# Patient Record
Sex: FEMALE | Race: WHITE | Employment: OTHER | URBAN - METROPOLITAN AREA
[De-identification: names, ages, dates, MRNs, and addresses within clinical notes are randomized per-mention and may not be internally consistent; named-entity substitution may affect disease eponyms.]

---

## 2017-05-23 ENCOUNTER — HOSPITAL ENCOUNTER (INPATIENT)
Facility: HOSPITAL | Age: 62
LOS: 4 days | Discharge: HOME | DRG: 280 | End: 2017-05-28
Attending: EMERGENCY MEDICINE | Admitting: INTERNAL MEDICINE
Payer: COMMERCIAL

## 2017-05-23 ENCOUNTER — APPOINTMENT (OUTPATIENT)
Dept: GENERAL RADIOLOGY | Facility: HOSPITAL | Age: 62
DRG: 280 | End: 2017-05-23
Payer: COMMERCIAL

## 2017-05-23 DIAGNOSIS — I21.4 NSTEMI (NON-ST ELEVATED MYOCARDIAL INFARCTION) (HCC): ICD-10-CM

## 2017-05-23 DIAGNOSIS — I50.9 ACUTE ON CHRONIC CONGESTIVE HEART FAILURE, UNSPECIFIED CONGESTIVE HEART FAILURE TYPE: ICD-10-CM

## 2017-05-23 DIAGNOSIS — R06.89 RESPIRATORY INSUFFICIENCY: Primary | ICD-10-CM

## 2017-05-23 DIAGNOSIS — R19.5 FECAL OCCULT BLOOD TEST POSITIVE: ICD-10-CM

## 2017-05-23 DIAGNOSIS — R09.02 HYPOXIA: ICD-10-CM

## 2017-05-23 DIAGNOSIS — D50.9 MICROCYTIC ANEMIA: ICD-10-CM

## 2017-05-23 PROCEDURE — 71010 XR CHEST AP PORTABLE  (CPT=71010): CPT | Performed by: EMERGENCY MEDICINE

## 2017-05-23 RX ORDER — FUROSEMIDE 10 MG/ML
40 INJECTION INTRAMUSCULAR; INTRAVENOUS ONCE
Status: COMPLETED | OUTPATIENT
Start: 2017-05-23 | End: 2017-05-23

## 2017-05-24 ENCOUNTER — APPOINTMENT (OUTPATIENT)
Dept: CV DIAGNOSTICS | Facility: HOSPITAL | Age: 62
DRG: 280 | End: 2017-05-24
Attending: INTERNAL MEDICINE
Payer: COMMERCIAL

## 2017-05-24 ENCOUNTER — APPOINTMENT (OUTPATIENT)
Dept: CT IMAGING | Facility: HOSPITAL | Age: 62
DRG: 280 | End: 2017-05-24
Attending: EMERGENCY MEDICINE
Payer: COMMERCIAL

## 2017-05-24 PROBLEM — I21.4 NSTEMI (NON-ST ELEVATED MYOCARDIAL INFARCTION) (HCC): Status: ACTIVE | Noted: 2017-05-24

## 2017-05-24 PROBLEM — R19.5 FECAL OCCULT BLOOD TEST POSITIVE: Status: ACTIVE | Noted: 2017-05-24

## 2017-05-24 PROBLEM — I50.9 ACUTE ON CHRONIC CONGESTIVE HEART FAILURE, UNSPECIFIED CONGESTIVE HEART FAILURE TYPE: Status: ACTIVE | Noted: 2017-05-24

## 2017-05-24 PROBLEM — D50.9 MICROCYTIC ANEMIA: Status: ACTIVE | Noted: 2017-05-24

## 2017-05-24 PROBLEM — R09.02 HYPOXIA: Status: ACTIVE | Noted: 2017-05-24

## 2017-05-24 PROCEDURE — 30233N1 TRANSFUSION OF NONAUTOLOGOUS RED BLOOD CELLS INTO PERIPHERAL VEIN, PERCUTANEOUS APPROACH: ICD-10-PCS | Performed by: HOSPITALIST

## 2017-05-24 PROCEDURE — 71260 CT THORAX DX C+: CPT | Performed by: EMERGENCY MEDICINE

## 2017-05-24 PROCEDURE — 99253 IP/OBS CNSLTJ NEW/EST LOW 45: CPT | Performed by: INTERNAL MEDICINE

## 2017-05-24 PROCEDURE — 99291 CRITICAL CARE FIRST HOUR: CPT | Performed by: INTERNAL MEDICINE

## 2017-05-24 PROCEDURE — 93306 TTE W/DOPPLER COMPLETE: CPT | Performed by: INTERNAL MEDICINE

## 2017-05-24 RX ORDER — HEPARIN SODIUM AND DEXTROSE 10000; 5 [USP'U]/100ML; G/100ML
INJECTION INTRAVENOUS CONTINUOUS
Status: CANCELLED | OUTPATIENT
Start: 2017-05-24

## 2017-05-24 RX ORDER — POTASSIUM CHLORIDE 20 MEQ/1
40 TABLET, EXTENDED RELEASE ORAL ONCE
Status: COMPLETED | OUTPATIENT
Start: 2017-05-24 | End: 2017-05-24

## 2017-05-24 RX ORDER — HEPARIN SODIUM 1000 [USP'U]/ML
60 INJECTION, SOLUTION INTRAVENOUS; SUBCUTANEOUS ONCE
Status: DISCONTINUED | OUTPATIENT
Start: 2017-05-24 | End: 2017-05-24

## 2017-05-24 RX ORDER — SODIUM CHLORIDE 9 MG/ML
INJECTION, SOLUTION INTRAVENOUS
Status: DISPENSED
Start: 2017-05-24 | End: 2017-05-24

## 2017-05-24 RX ORDER — HEPARIN SODIUM AND DEXTROSE 10000; 5 [USP'U]/100ML; G/100ML
12 INJECTION INTRAVENOUS ONCE
Status: DISCONTINUED | OUTPATIENT
Start: 2017-05-24 | End: 2017-05-24

## 2017-05-24 RX ORDER — PANTOPRAZOLE SODIUM 40 MG/1
40 TABLET, DELAYED RELEASE ORAL
Status: DISCONTINUED | OUTPATIENT
Start: 2017-05-24 | End: 2017-05-28

## 2017-05-24 RX ORDER — BENZONATATE 100 MG/1
100 CAPSULE ORAL 3 TIMES DAILY PRN
Status: DISCONTINUED | OUTPATIENT
Start: 2017-05-24 | End: 2017-05-28

## 2017-05-24 RX ORDER — HEPARIN SODIUM 5000 [USP'U]/ML
5000 INJECTION, SOLUTION INTRAVENOUS; SUBCUTANEOUS EVERY 12 HOURS SCHEDULED
Status: DISCONTINUED | OUTPATIENT
Start: 2017-05-24 | End: 2017-05-25

## 2017-05-24 RX ORDER — ASPIRIN 81 MG/1
324 TABLET, CHEWABLE ORAL ONCE
Status: COMPLETED | OUTPATIENT
Start: 2017-05-24 | End: 2017-05-24

## 2017-05-24 NOTE — PROGRESS NOTES
SPOKE WITH DR. Antonio Hightower REGARDING PATIENT PLAN OF CARE. PATIENT'S 2D ECHO RESULTED LV FUNCTION NORMAL. PATIENT HAD SEVERE ANEMIA ON ADMISSION, CAUSING LACK OF OXYGENATION TO HER HEART. PLAN TO RECHECK H/H, TRANSFUSE IF NECESSARY, AND START LOPRESSOR.  NOTIFI

## 2017-05-24 NOTE — CONSULTS
Cardiology Consult Phoebe Burkitt)   Dictated #[id number]  May 24, 3027    IMPRESSION:  Severe anemia--iron deficiency  --? GI blood loss  Leukocytosis/Thrombocytosis  CHF--probably high output  Elevated troponins-doubt ACS--probably demand ischemia  Hyponatremia

## 2017-05-24 NOTE — PROGRESS NOTES
SPOKE WITH DR. Marisa Munguia VIA TELEPHONE REGARDING PLAN OF CARE. DUE TO PATIENT'S INTERMITTENT RESPIRATORY DISTRESS AND DESATURATION, PATIENT TO REMAIN ICU STATUS TODAY. CONTINUING TO MONITOR FREQUENTLY.

## 2017-05-24 NOTE — PLAN OF CARE
Problem: PAIN - ADULT  Goal: Verbalizes/displays adequate comfort level or patient’s stated pain goal  INTERVENTIONS:  - Encourage pt to monitor pain and request assistance  - Assess pain using appropriate pain scale  - Administer analgesics based on type on oxygen saturation or ABGs  - Provide Smoking Cessation handout, if applicable  - Encourage broncho-pulmonary hygiene including cough, deep breathe, Incentive Spirometry  - Assess the need for suctioning and perform as needed  - Assess and instruct to re

## 2017-05-24 NOTE — CONSULTS
[facility]    PATIENT'S NAME: Shon Baum   ATTENDING PHYSICIAN: José Miguel Peters. Reuben Ford MD   CONSULTING PHYSICIAN: Sean Antonio MD   PATIENT ACCOUNT#:   [id number]    LOCATION:  [address] #:   [id number]       DATE OF BIRTH: history of myocardial infarct, congestive heart failure. No palpitations, presyncope, or syncope. GASTROINTESTINAL: No nausea or vomiting. No melena or hematochezia. GENITOURINARY: No dysuria or hematuria.   MUSCULOSKELETAL: There is no limiting arthrit thrombocytosis. 2.   Congestive heart failure:  Probably high output heart failure. 3.   Elevated troponins:  Doubt ACS, probably demand ischemia. 4.   Hyponatremia. RECOMMENDATIONS:    1. Blood transfusion, diuresis. 2.   Serial enzymes.   3.   Ec

## 2017-05-24 NOTE — PROGRESS NOTES
[place] [facility]    Progress Note    Micki Hansen Patient Status:  Inpatient    1955 MRN [id number]   Location [facility] 2W/SW Attending Avril Christensen, [address] [facility] [address] Day # 1 PCP Seth Soto         Assessment and Plan:     Respi Sodium (Porcine)  5,000 Units Subcutaneous 2 times per day   • metoprolol Tartrate  25 mg Oral 2x Daily(Beta Blocker)   • nitroGLYCERIN  0.5 inch Topical Q6H             Results:     Lab Results  Component Value Date   WBC 18.3* 05/24/2017   HGB 8.6* 05/24 hypertrophy (strain) or digitalis effect -consider ischemia consider Anterior ischemia.  ABNORMAL When compared with ECG of 05/24/2017 00:41:12 No significant changes have occurred Electronically signed on 05/24/2017 at 12:15 by Luis Armando Reich MD    Ekg 12-le

## 2017-05-24 NOTE — ED INITIAL ASSESSMENT (HPI)
Pt reports generalized weakness. Pt reports sob. Pt reports dry cough. Pt denies cp. Pt appears to be jaundice.

## 2017-05-24 NOTE — ED PROVIDER NOTES
Patient Seen in: [facility] AND [facility] Emergency Department    History   Patient presents with:  Dehydration (metabolic/constitutional)    Stated Complaint:  Johanny Area    HPI    58 yo F without PMH/PSH presenting for evaluation of several days of nonpro retractions, bibasilar crackles. Abdominal: Soft. Musculoskeletal: No gross deformity. BLE with trace edema. Neurological: Alert. Skin: Skin is warm. Psychiatric: Cooperative. Nursing note and vitals reviewed.         ED Course     Labs Reviewed (*)     MCV 63.8 (*)     MCH 18.7 (*)     MCHC 29.3 (*)     RDW 19.0 (*)      (*)     All other components within normal limits   VITAMIN B12 - Normal   LACTIC ACID, PLASMA - Normal   FOLIC ACID SERUM(FOLATE)   LIPID PANEL   CBC WITH DIFFERENTIAL WI edema. Case discussed with  Dr. Ailin Serra at 12:17 Pamela Howard M.D. This report has been electronically signed and verified by the Radiologist whose name is printed above.     DD:  05/23/2017/DT:  05/24/2017     This report contains privileged a morphology though though given LVH/strain pattern to V1/V2 with diffuse depression, case d/w [facility] Dr. Edgar Avitia and in agreement for non-STEMI morphology.   0004: Case d/w GI and amenable to anticoagulation should it become necessary given trace guiaic positivi

## 2017-05-24 NOTE — ED NOTES
I was not able to get enough for a complete second set of blood cultures. Filled the aerobic culture bottle and sent it down. Informed Doctor Niyah Wilkins.

## 2017-05-24 NOTE — H&P
[address] [place] [address] Patient Status:  Emergency    1955 MRN [id number]   Location [address] [facility] Attending Mi Santamaria MD   Hosp Day # 1 PCP Oz Cunha     Date:   CUATE , supple   Chest : bibasilar crackles with diminished breath sounds in the basis , no wheezes or rhonchi   Heart  : tachy regular S1 S2 , + gallop  No murmur   abd soft and benign exam no G/R and no organomegaly or mass   Ext no edema , no calf tendern ?  Reactive or underline infectious process   Check cultures   Check procalcitonin   Rocephin and azithromycin     5- prophylaxis   GI : PPI   DVT : scd for now since anemia and possible GIB       D/w pt and daughter in length   D/w Cardiology     > 40 mi

## 2017-05-24 NOTE — CONSULTS
[facility]  Report of GI Consultation    Vincent Cordero Patient Status:  Inpatient    1955 MRN [id number]   Location [facility] 2W/SW Attending Felton Castaneda MD   Hosp Day # 1 PCP Frederick Caballero     Date of Admission:   file    Social History Narrative    None on file            Current Medications:    Current Facility-Administered Medications:  CefTRIAXone Sodium (ROCEPHIN) 1 g in sodium chloride 0.9 % 100 mL IVPB-minibag/addVantage 1 g Intravenous Q24H   azithromycin (Z 05/23/2017   TROP 2.43* 05/24/2017           No results for input(s): URINE, CULTI, BLDSMR in the last 72 hours.     Recent Labs   Lab  05/23/17   2242  05/24/17   0555  05/24/17   0935   ALT  15   --    --    AST  32   --    --    ALKPHO  63   --    -- participate in the care of your patient.     Daniel Batres  5/24/2017

## 2017-05-25 ENCOUNTER — APPOINTMENT (OUTPATIENT)
Dept: GENERAL RADIOLOGY | Facility: HOSPITAL | Age: 62
DRG: 280 | End: 2017-05-25
Attending: INTERNAL MEDICINE
Payer: COMMERCIAL

## 2017-05-25 PROCEDURE — 99233 SBSQ HOSP IP/OBS HIGH 50: CPT | Performed by: INTERNAL MEDICINE

## 2017-05-25 PROCEDURE — 71010 XR CHEST AP PORTABLE  (CPT=71010): CPT | Performed by: INTERNAL MEDICINE

## 2017-05-25 PROCEDURE — 99232 SBSQ HOSP IP/OBS MODERATE 35: CPT | Performed by: INTERNAL MEDICINE

## 2017-05-25 RX ORDER — CHLORHEXIDINE GLUCONATE 4 G/100ML
30 SOLUTION TOPICAL
Status: COMPLETED | OUTPATIENT
Start: 2017-05-26 | End: 2017-05-25

## 2017-05-25 RX ORDER — SODIUM CHLORIDE 9 MG/ML
INJECTION, SOLUTION INTRAVENOUS CONTINUOUS
Status: DISCONTINUED | OUTPATIENT
Start: 2017-05-26 | End: 2017-05-26

## 2017-05-25 RX ORDER — SODIUM CHLORIDE 9 MG/ML
INJECTION, SOLUTION INTRAVENOUS CONTINUOUS
Status: DISCONTINUED | OUTPATIENT
Start: 2017-05-25 | End: 2017-05-26

## 2017-05-25 RX ORDER — ASPIRIN 81 MG/1
324 TABLET, CHEWABLE ORAL ONCE
Status: DISCONTINUED | OUTPATIENT
Start: 2017-05-25 | End: 2017-05-27 | Stop reason: HOSPADM

## 2017-05-25 RX ORDER — SODIUM CHLORIDE 9 MG/ML
INJECTION, SOLUTION INTRAVENOUS
Status: COMPLETED
Start: 2017-05-25 | End: 2017-05-25

## 2017-05-25 RX ORDER — CHLORHEXIDINE GLUCONATE 4 G/100ML
30 SOLUTION TOPICAL
Status: COMPLETED | OUTPATIENT
Start: 2017-05-26 | End: 2017-05-26

## 2017-05-25 RX ORDER — ASPIRIN 81 MG/1
324 TABLET, CHEWABLE ORAL ONCE
Status: COMPLETED | OUTPATIENT
Start: 2017-05-26 | End: 2017-05-26

## 2017-05-25 NOTE — PLAN OF CARE
Altered Communication/Language Barrier    • Patient/Family is able to understand and participate in their care Progressing        CARDIOVASCULAR - ADULT    • Maintains optimal cardiac output and hemodynamic stability Progressing        DISCHARGE PLANNING

## 2017-05-25 NOTE — PROGRESS NOTES
[place] [facility]    Progress Note    Joe Sage Patient Status:  Inpatient    1955 MRN [id number]   Location [facility] 2W/SW Attending Janes Martinez, [address] [facility] Day # 2 PCP Geovanni Aguirre         Assessment and Plan:   Danae Medina Patient presents today for Follow-up re:    Bilateral  shoulder   Current Symptoms:  pain    Last visit:  12/1/2016  Treatment since last seen:  physical therapy or exercise program for more than six weeks    Improved since last visit?   Yes    CURRENT Pain Medication: Med None  CURRENT WORK STATUS:  Does not apply     At today’s visit, patient needs:  - Work Status record updated?:    No  - Medication refill?:  No    Patient is here for a follow up after going to Physical therapy visits, He states therapy didn't help any. It irritated the hip he stated. He wants to talk about maybe a muscle relaxer and or something for pain.     or lesions              Scheduled Meds:    • cefTRIAXone  1 g Intravenous Q24H   • azithromycin  500 mg Intravenous Q24H   • Pantoprazole Sodium  40 mg Oral QAM AC   • Heparin Sodium (Porcine)  5,000 Units Subcutaneous 2 times per day   • metoprolol Cora Hendrix -------------------------- Sinus Tachycardia -Left atrial enlargement. Voltage criteria for LVH met.  -Anterior infarct -age undetermined.   -ST depression + Negative T-waves -Seen with left ventricular hypertrophy (strain) or digitalis effect -consider is

## 2017-05-25 NOTE — PROGRESS NOTES
[facility]  GI SERVICE PROGRESS NOTE    Adenike Beverly Patient Status:  Inpatient    1955 MRN [id number]   Location [facility] 2W/SW Attending Guera Jay MD   Hosp Day # 2 PCP Tanner Walters       Subjective:     Feeling the last 72 hours. Xr Chest Ap Portable  (cpt=71010)    5/25/2017  CONCLUSION:  1. Improvement in pulmonary edema changes bilaterally with decrease in bibasilar consolidation and pleural effusion. Can't exclude bibasilar pneumonia.  Followup studies ar 1146.    She has been admitted to ICU for observation of her respiratory and cardiac status, transfusion.  Echocardiogram is pending.    5/25: wbc 18,800 --> 16,500; H:H stable; BNP 1146 --> 634; similar mild troponin elevation 1.91      PLAN:    Carolyn Galindo

## 2017-05-25 NOTE — PROGRESS NOTES
[facility]    Progress Note      Assessment and Plan:   1. Severe microcytic anemia    Recommendations: PPI, follow hemoglobin, will need upper and lower endoscopy in the future when stabilized from a cardiac perspective.   As per gastroent 05/25/2017     Chest x-ray– bibasilar haziness    Rox Delgado MD  Medical Director, Critical Care, [address] [place] [address]  Medical Director, [facility]  Pager: [phone]

## 2017-05-25 NOTE — PLAN OF CARE
Problem: PAIN - ADULT  Goal: Verbalizes/displays adequate comfort level or patient’s stated pain goal  INTERVENTIONS:  - Encourage pt to monitor pain and request assistance  - Assess pain using appropriate pain scale  - Administer analgesics based on type changes in respiratory status  - Assess for changes in mentation and behavior  - Position to facilitate oxygenation and minimize respiratory effort  - Oxygen supplementation based on oxygen saturation or ABGs  - Provide Smoking Cessation handout, if applic MONITOR. FOLLOWING SKIN RISK PROTOCOL.

## 2017-05-25 NOTE — PROGRESS NOTES
PATIENT TO TRANSFER FROM 214 . REPORT GIVEN TO EMI BELLO. MEDICATIONS, LABS, PLAN OF CARE REVIEWED. ALL BELONGINGS WITH PATIENT. DAUGHTER, DAYAN, NOTIFIED OF PATIENT TRANSFER. NO FURTHER QUESTIONS OR CONCERNS.

## 2017-05-26 ENCOUNTER — APPOINTMENT (OUTPATIENT)
Dept: INTERVENTIONAL RADIOLOGY/VASCULAR | Facility: HOSPITAL | Age: 62
DRG: 280 | End: 2017-05-26
Attending: INTERNAL MEDICINE
Payer: COMMERCIAL

## 2017-05-26 ENCOUNTER — APPOINTMENT (OUTPATIENT)
Dept: GENERAL RADIOLOGY | Facility: HOSPITAL | Age: 62
DRG: 280 | End: 2017-05-26
Attending: INTERNAL MEDICINE
Payer: COMMERCIAL

## 2017-05-26 PROBLEM — I50.9 CHF (CONGESTIVE HEART FAILURE) (HCC): Status: ACTIVE | Noted: 2017-05-26

## 2017-05-26 PROCEDURE — 4A023N6 MEASUREMENT OF CARDIAC SAMPLING AND PRESSURE, RIGHT HEART, PERCUTANEOUS APPROACH: ICD-10-PCS | Performed by: INTERNAL MEDICINE

## 2017-05-26 PROCEDURE — 99233 SBSQ HOSP IP/OBS HIGH 50: CPT | Performed by: INTERNAL MEDICINE

## 2017-05-26 PROCEDURE — B2111ZZ FLUOROSCOPY OF MULTIPLE CORONARY ARTERIES USING LOW OSMOLAR CONTRAST: ICD-10-PCS | Performed by: INTERNAL MEDICINE

## 2017-05-26 PROCEDURE — 71010 XR CHEST AP PORTABLE  (CPT=71010): CPT | Performed by: INTERNAL MEDICINE

## 2017-05-26 PROCEDURE — B41F1ZZ FLUOROSCOPY OF RIGHT LOWER EXTREMITY ARTERIES USING LOW OSMOLAR CONTRAST: ICD-10-PCS | Performed by: INTERNAL MEDICINE

## 2017-05-26 PROCEDURE — B3101ZZ FLUOROSCOPY OF THORACIC AORTA USING LOW OSMOLAR CONTRAST: ICD-10-PCS | Performed by: INTERNAL MEDICINE

## 2017-05-26 PROCEDURE — 99233 SBSQ HOSP IP/OBS HIGH 50: CPT | Performed by: HOSPITALIST

## 2017-05-26 PROCEDURE — 99232 SBSQ HOSP IP/OBS MODERATE 35: CPT | Performed by: INTERNAL MEDICINE

## 2017-05-26 RX ORDER — MIDAZOLAM HYDROCHLORIDE 1 MG/ML
INJECTION INTRAMUSCULAR; INTRAVENOUS
Status: COMPLETED
Start: 2017-05-26 | End: 2017-05-26

## 2017-05-26 RX ORDER — ENALAPRIL MALEATE 2.5 MG/1
2.5 TABLET ORAL 2 TIMES DAILY
Status: DISCONTINUED | OUTPATIENT
Start: 2017-05-26 | End: 2017-05-28

## 2017-05-26 RX ORDER — MIDAZOLAM HYDROCHLORIDE 1 MG/ML
2 INJECTION INTRAMUSCULAR; INTRAVENOUS EVERY 5 MIN PRN
Status: DISCONTINUED | OUTPATIENT
Start: 2017-05-26 | End: 2017-05-28

## 2017-05-26 RX ORDER — SODIUM CHLORIDE 9 MG/ML
INJECTION, SOLUTION INTRAVENOUS
Status: COMPLETED
Start: 2017-05-26 | End: 2017-05-26

## 2017-05-26 RX ORDER — LIDOCAINE HYDROCHLORIDE 20 MG/ML
INJECTION, SOLUTION EPIDURAL; INFILTRATION; INTRACAUDAL; PERINEURAL
Status: COMPLETED
Start: 2017-05-26 | End: 2017-05-26

## 2017-05-26 RX ORDER — SODIUM CHLORIDE 9 MG/ML
INJECTION, SOLUTION INTRAVENOUS CONTINUOUS
Status: ACTIVE | OUTPATIENT
Start: 2017-05-26 | End: 2017-05-26

## 2017-05-26 RX ORDER — AZITHROMYCIN 250 MG/1
500 TABLET, FILM COATED ORAL EVERY 24 HOURS
Status: DISCONTINUED | OUTPATIENT
Start: 2017-05-27 | End: 2017-05-28

## 2017-05-26 RX ORDER — 0.9 % SODIUM CHLORIDE 0.9 %
VIAL (ML) INJECTION
Status: COMPLETED
Start: 2017-05-26 | End: 2017-05-26

## 2017-05-26 NOTE — PROGRESS NOTES
[facility]    Progress Note    Micki Hansen Patient Status:  Inpatient    1955 MRN [id number]   Location [facility] 3W/SW Attending Cheryl Mackay, [address] Day # 3 PCP Yvonne Barboza       Subjective:   Micki Hansen is a ALB 3.3* 05/23/2017   ALKPHO 63 05/23/2017   BILT 0.8 05/23/2017   TP 6.8 05/23/2017   AST 32 05/23/2017   ALT 15 05/23/2017   PTT 24.1 05/26/2017   INR 1.1 05/26/2017   LIP 12* 05/23/2017   DDIMER 2.19* 05/23/2017   TROP 1.91* 05/25/2017   B12 377 05/23 with Rocephin and azithromycin     5- prophylaxis    GI : PPI    DVT : scd for now since anemia and possible GIB               John Paul Skill, DO  >35 min spent with patient.  > 50% time was spent counseling patient, discussing plan of care, discussing lab

## 2017-05-26 NOTE — PROGRESS NOTES
[facility] Pharmacy Note: Route Optimization for Azithromycin [facility])    Patient is currently on Azithromycin (ZITHROMAX) 500 mg IV every 24 hours.    The patient meets the criteria to convert to the oral equivalent as established by the IV to Oral conversion

## 2017-05-26 NOTE — PLAN OF CARE
Altered Communication/Language Barrier    • Patient/Family is able to understand and participate in their care Progressing        CARDIOVASCULAR - ADULT    • Maintains optimal cardiac output and hemodynamic stability Progressing        PAIN - ADULT    • Ve

## 2017-05-26 NOTE — PAYOR COMM NOTE
Jewel Monet #[id number]  (57 year old F)       [facility] [id number]         Denise Lovell MD Physician Signed  ED Provider Notes 5/23/2017 10:46 PM      Expand All Collapse All    Patient Seen in: [facility] Emergency Department  [facility] 85%        Physical Exam   Constitutional: Mild respiratory distress. HEENT: Dry MM. Head: Normocephalic. Eyes: Pale conjuntivae. Cardiovascular: Regularly tachycardic. Pulmonary/Chest: Tachypneic with intercostal retractions, bibasilar crackles.   A components within normal limits    ARTERIAL BLOOD GAS - Abnormal; Notable for the following:       ABG pH  7.50 (*)        ABG pCO2  31 (*)        Oxygen Content  7.0 (*)        All other components within normal limits    CBC W/ DIFFERENTIAL - Abnormal; N GREEN    RAINBOW DRAW LAVENDER TALL (BNP)    BLOOD CULTURE    BLOOD CULTURE        EKG    Rate, intervals and axes as noted on EKG Report.   Rate: 115  Rhythm: Sinus Rhythm  Reading: sinus tach 115 with LVH/strain to V1/V2 and diffuse STD/TWI without prior sent with plan for 2 units pRBC to be transfused. NSTEMI noted, EKG markedly abnormal and cardiology in agreement with LVH/strain in patient without active chest pain.  Leukocytosis noted in setting of cough - will empirically antibiose given aforementioned Physical      Heath Dimock Patient Status:  Irena Narrow 6/2/1955  [address] [facility] [id number]    [address] Mabel Adame MD    Hosp Day #  1  PCP  Leigh Ann Godoy     Date:  5/24/2017  Date of Admission:  5 randall , anicteric sclera , pale    Neck no JVD , no CUATE , supple    Chest : bibasilar crackles with diminished breath sounds in the basis , no wheezes or rhonchi    Heart  : tachy regular S1 S2 , + gallop  No murmur    abd soft and benign exam no G/R and n cardiology [facility] on call     3- acute hypoxemic respiratory failure    Secondary to above    Tolerating O2 therapy and Bipap for now    If get worse might need intubation and MV       4- Leukocytosis    ? Reactive or underline infectious process    Check cul melena, hematochezia, no previous GI evaluation.      PAST MEDICAL HISTORY:  Chronic illnesses, none. PAST SURGICAL HISTORY:  None. MEDICATIONS:  None. ALLERGIES:  None.     SOCIAL HISTORY:  Tobacco, none.  Ethanol, occasional.  Caffeine use, drink is 91.  Blood type is AB negative.  H and H of  5.5 and 18.9, white blood count 21.0, platelet count of 913,902.  INR 1.3, PTT 22.9.  D-dimer 2.19.  Procalcitonin 0.66.  BNP 1146.  Sodium 126, potassium 4.6, chloride 92, CO2 of 21, BUN 14, creatinine 0.78, when stabilized from a cardiac perspective.  As per gastroenterology.     2.  Non-ST elevation myocardial infarction    Recommendations: Await cardiac catheterization tomorrow    3.  Severe aortic stenosis with moderate regurgitation and marked pulmonary hy [phone]  Pager: [phone]  Danny Merino #[id number]  (57 year old F)       [facility] [id number]         Elinor Murphy MD Physician Signed  Progress Notes 5/26/2017 12:31 PM      Expand All Collapse All      [facility] Memor troponin with abnormal  EKG / high BNP  / acute CHf exacerbation    Moderate to severe AS    Cardiac cath today        3- s/p acute hypoxemic respiratory failure    Much better     4- Leukocytosis  / better   ?  Reactive or underline infectious process    S

## 2017-05-26 NOTE — PROGRESS NOTES
[place] [facility] - [facility]    Progress Note    Jonathan Destini Patient Status:  Inpatient    1955 MRN [id number]   Location [facility] 3W/SW Attending Cristina Fajardo, [address] [facility] Day # 3 PCP Justa Wilson     Subjective:     Constitutional:    Continue with Rocephin and azithromycin     5- prophylaxis    GI : PPI    DVT : scd for now since anemia and possible GIB     Stable on tele         Results:     Lab Results  Component Value Date   WBC 13.4* 05/26/2017   HGB 9.3* 05/26/2017   HCT 29.4*

## 2017-05-26 NOTE — PROGRESS NOTES
[place] [facility]    Brief Cardiac Cath Note  Carlos Rider Patient Status:  Inpatient    1955 MRN [id number]   Location [facility] Attending Omer Rea, [address] [facility] Day # 3 PCP [address] [facility]

## 2017-05-27 ENCOUNTER — SURGERY (OUTPATIENT)
Age: 62
End: 2017-05-27

## 2017-05-27 PROCEDURE — 45381 COLONOSCOPY SUBMUCOUS NJX: CPT | Performed by: INTERNAL MEDICINE

## 2017-05-27 PROCEDURE — 0D5H8ZZ DESTRUCTION OF CECUM, VIA NATURAL OR ARTIFICIAL OPENING ENDOSCOPIC: ICD-10-PCS | Performed by: INTERNAL MEDICINE

## 2017-05-27 PROCEDURE — 43239 EGD BIOPSY SINGLE/MULTIPLE: CPT | Performed by: INTERNAL MEDICINE

## 2017-05-27 PROCEDURE — 45388 COLONOSCOPY W/ABLATION: CPT | Performed by: INTERNAL MEDICINE

## 2017-05-27 PROCEDURE — 99233 SBSQ HOSP IP/OBS HIGH 50: CPT | Performed by: HOSPITALIST

## 2017-05-27 PROCEDURE — 0DB98ZX EXCISION OF DUODENUM, VIA NATURAL OR ARTIFICIAL OPENING ENDOSCOPIC, DIAGNOSTIC: ICD-10-PCS | Performed by: INTERNAL MEDICINE

## 2017-05-27 PROCEDURE — 99232 SBSQ HOSP IP/OBS MODERATE 35: CPT | Performed by: INTERNAL MEDICINE

## 2017-05-27 PROCEDURE — 0DB68ZX EXCISION OF STOMACH, VIA NATURAL OR ARTIFICIAL OPENING ENDOSCOPIC, DIAGNOSTIC: ICD-10-PCS | Performed by: INTERNAL MEDICINE

## 2017-05-27 RX ORDER — MIDAZOLAM HYDROCHLORIDE 1 MG/ML
INJECTION INTRAMUSCULAR; INTRAVENOUS
Status: DISCONTINUED | OUTPATIENT
Start: 2017-05-27 | End: 2017-05-27 | Stop reason: HOSPADM

## 2017-05-27 NOTE — PROGRESS NOTES
[facility]    Progress Note      Assessment and Plan:   1. 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 Recommendations:   As per gastroenterology.     2.  Non-ST elevation myocardial infarction–without critical coronar Chest x-ray– bibasilar haziness    Flor Fung MD  Medical Director, [phone] [facility]  Medical Director, [facility]  Pager: [phone]

## 2017-05-27 NOTE — DIETARY NOTE
ADULT NUTRITION INITIAL ASSESSMENT    Pt is at moderate nutrition risk. Pt does not meet malnutrition criteria.       RECOMMENDATIONS TO MD: none at this time     NUTRITION DIAGNOSIS/PROBLEM:  Inadequate oral intake related to decreased ability to consume PRESCRIPTION:  Diet: 2 gm sodium  Oral Supplements declined  Calories: 2259-4285 calories/day (25-30 calories per kg)  Protein: 62 grams protein/day (1.2 grams protein per kg)    MONITOR AND EVALUATE/NUTRITION GOALS:  - Food and Nutrient Intake:      Monit

## 2017-05-27 NOTE — PROGRESS NOTES
[facility]  GI SERVICE PROGRESS NOTE    Laci Adair Patient Status:  Inpatient    1955 MRN [id number]   Location [facility] 2W/SW Attending Claribel Newell MD   Hosp Day # 3 PCP Clyde Hallman       Subjective:     Feeling 05/25/17   0600   TROP  1.42*  1.78*  2.43*  1.91*   B12  377   --    --    --        No results for input(s): URINE, CULTI, BLDSMR in the last 72 hours. Xr Chest Ap Portable  (cpt=71010)    5/26/2017  CONCLUSION:  1.  Cardiomegaly with moderate CHF wi Currently receiving ceftriaxone and azithromycin antibiotics    3. Empiric pantoprazole PPI medication    4. Continue to follow H&H    5. Prep for EGD and colonoscopy examinations tomorrow May 27th. Keegan Covert and risks of those exams discussed tonight.

## 2017-05-27 NOTE — PROGRESS NOTES
[facility]    Progress Note    Olivia Gavi Patient Status:  Inpatient    1955 MRN [id number]   Location [facility] 3W/SW Attending Linette Rush, [address] Day # 4 PCP Derek Shrestha Ok       Subjective:   Olivia Gatica is a 05/23/2017   DDIMER 2.19* 05/23/2017   TROP 1.91* 05/25/2017   B12 377 05/23/2017       Xr Chest Ap Portable  (cpt=71010)    5/26/2017  CONCLUSION:  1.  Cardiomegaly with moderate CHF with bibasilar lung consolidation/atelectasis/effusions slightly worse th 5/27/2017

## 2017-05-27 NOTE — OPERATIVE REPORT
EGD AND COLONOSCOPY PROCEDURE REPORTS:    DATE OF PROCEDURE:  5/27/2017    PCP: Justa Wilson     PREOPERATIVE DIAGNOSIS: Severe iron deficiency anemia, occult blood in the stool     POSTOPERATIVE DIAGNOSIS:  See impression. SURGEON:  Anuel ANDRES Retroflexion was performed in the rectum. The quality of the prep was excellent. Completely clear prep today. COLONOSCOPY FINDINGS:  · Single large somewhat irregular AVM lesion over 8 mm in size in the cecum.   This was brushed with the colonoscope

## 2017-05-27 NOTE — PLAN OF CARE
CARDIOVASCULAR - ADULT    • Maintains optimal cardiac output and hemodynamic stability Adequate for Discharge          Altered Communication/Language Barrier    • Patient/Family is able to understand and participate in their care Progressing        DISCHAR

## 2017-05-28 VITALS
OXYGEN SATURATION: 93 % | BODY MASS INDEX: 18.56 KG/M2 | TEMPERATURE: 98 F | WEIGHT: 115.5 LBS | HEIGHT: 66 IN | DIASTOLIC BLOOD PRESSURE: 47 MMHG | SYSTOLIC BLOOD PRESSURE: 140 MMHG | RESPIRATION RATE: 18 BRPM | HEART RATE: 92 BPM

## 2017-05-28 PROCEDURE — 99232 SBSQ HOSP IP/OBS MODERATE 35: CPT | Performed by: INTERNAL MEDICINE

## 2017-05-28 PROCEDURE — 99239 HOSP IP/OBS DSCHRG MGMT >30: CPT | Performed by: HOSPITALIST

## 2017-05-28 RX ORDER — PANTOPRAZOLE SODIUM 40 MG/1
40 TABLET, DELAYED RELEASE ORAL
Qty: 30 TABLET | Refills: 0 | Status: ON HOLD | OUTPATIENT
Start: 2017-05-28 | End: 2017-07-01

## 2017-05-28 RX ORDER — FUROSEMIDE 10 MG/ML
40 INJECTION INTRAMUSCULAR; INTRAVENOUS ONCE
Status: COMPLETED | OUTPATIENT
Start: 2017-05-28 | End: 2017-05-28

## 2017-05-28 RX ORDER — ENALAPRIL MALEATE 2.5 MG/1
2.5 TABLET ORAL 2 TIMES DAILY
Qty: 60 TABLET | Refills: 0 | Status: SHIPPED | OUTPATIENT
Start: 2017-05-28 | End: 2017-06-20

## 2017-05-28 RX ORDER — POTASSIUM CHLORIDE 20 MEQ/1
40 TABLET, EXTENDED RELEASE ORAL ONCE
Status: COMPLETED | OUTPATIENT
Start: 2017-05-28 | End: 2017-05-28

## 2017-05-28 RX ORDER — CEFPODOXIME PROXETIL 200 MG/1
200 TABLET, FILM COATED ORAL 2 TIMES DAILY
Qty: 10 TABLET | Refills: 0 | Status: SHIPPED | OUTPATIENT
Start: 2017-05-28 | End: 2017-06-02

## 2017-05-28 NOTE — PLAN OF CARE
Problem: SAFETY ADULT - FALL  Goal: Free from fall injury  INTERVENTIONS:  - Assess pt frequently for physical needs  - Identify cognitive and physical deficits and behaviors that affect risk of falls.   - [place] fall precautions as indicated by assessme

## 2017-05-28 NOTE — PLAN OF CARE
SKIN/TISSUE INTEGRITY - ADULT    • Skin integrity remains intact Adequate for Discharge          Altered Communication/Language Barrier    • Patient/Family is able to understand and participate in their care Adequate for Discharge        CARDIOVASCULAR - A

## 2017-05-28 NOTE — PROGRESS NOTES
[facility]    Progress Note      Assessment and Plan:   1. 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 Only mild atrophic gastritis identified on the EGD. The hemoglobin is slightly up at 9.4.     Recommendations:   As Lab Results  Component Value Date   WBC 10.8 05/28/2017   HGB 9.4 05/28/2017   HCT 29.6 05/28/2017    05/28/2017   CREATSERUM 0.62 05/28/2017   BUN 10 05/28/2017    05/28/2017   K 3.8 05/28/2017    05/28/2017   CO2 25 05/28/2017

## 2017-05-28 NOTE — PHYSICAL THERAPY NOTE
Attempted eval; declined by RN, Glenn Fernandez. Per RN, Pt is discharged and to return home shortly.

## 2017-05-28 NOTE — PROGRESS NOTES
[facility]     Gastroenterology Progress Note    Janel Lindsey Patient Status:  Inpatient    1955 MRN [id number]   Location [facility] 3W/SW Attending No att. providers found   Hosp Day # 5 PCP 96 Chloe Nuñez normal      Results:     Recent Labs   Lab  05/26/17   0508  05/27/17   0410  05/28/17   0706   RBC  4.04  3.98  4.06   HGB  9.3*  9.0*  9.4*   HCT  29.4*  29.1*  29.6*   MCV  72.9*  73.0*  72.9*   MCH  23.0*  22.7*  23.0*   MCHC  31.5*  31.1*  31.6*   RDW

## 2017-05-28 NOTE — DISCHARGE SUMMARY
[place] [facility]    Discharge Summary    Carlos Rider Patient Status:  Inpatient    1955 MRN [id number]   Location [facility] 3W/SW Attending No att. providers found   Hosp Day # 5 PCP Robert Santa     Date of Admission:  calm        History of Present Illness:     57year old female with no know prior medical problems    Presented to ER with progressive weakness and sob x 2-3 weeks    Pale / fatigue and extremely weak    But no focal weakness or numbness    Sob and GOYAL wit Medications:      Discharge Medications      START taking these medications       Instructions Prescription details    Cefpodoxime Proxetil 200 MG Tabs   Commonly known as:  VANTIN        Take 1 tablet (200 mg total) by mouth 2 (two) times daily.     Stop t

## 2017-05-28 NOTE — PROGRESS NOTES
[facility]  Cardiology Progress Note    Joe Sage Patient Status:  Inpatient    1955 MRN [id number]   Location [facility] 3W/SW Attending Adam Maradiaga, [address] [facility] Day # 5 PCP Geovanni Aguirre       Subjective: Feels better, await TPROT    No results for input(s): TROP in the last 72 hours.     Allergies:     Vinylpyrrolidinone-*    Itching    Medications:    Current Facility-Administered Medications:  Potassium Chloride ER (K-DUR M20) CR tab 40 mEq 40 mEq Oral Once   iron sucrose (V

## 2017-05-31 NOTE — PAYOR COMM NOTE
Lemuel Talbert #[id number]  (57 year old F)       [address] [place] [address] [id number]         Sallee Dance, DO Physician Signed Hospitalist Discharge Summaries 5/28/2017  2:12 PM      Expand All [address]    Discharge Summary      Te S2 normal, no murmur, click, rub or gallop, regular rate and rhythm  Abdominal: soft, non-tender; bowel sounds normal; no masses,  no organomegaly  Extremities: extremities normal, atraumatic, no cyanosis or edema  Psychiatric: calm        History of Prese prophylaxis    GI : PPI    DVT : scd for now since anemia and possible GIB         Consultations: Dr Katlyn Angulo, Dr Carden[facility]     Procedures: Cath, cscope and EGD      Complications: none    Discharge Condition: Good    Discharge Medications:       Discharge Medica rhythm  Abdominal: soft, non-tender; bowel sounds normal; no masses,  no organomegaly  Extremities: extremities normal, atraumatic, no cyanosis or edema          Medicines:        Current Facility-Administered Medications:  fentaNYL citrate (SUBLIMAZE) 0.0 439*  05/26/2017    PLT  381  05/25/2017          Recent Labs    Lab  05/25/17   0600   05/26/17   0508   05/27/17   0410    GLU   124*   118*   125*    BUN   9   12   10    CREATSERUM   0.77   0.63   0.62    GFRAA   >60   >60   >60    GFRNAA   >60   >60  cp, sob    No c/d    No n/v        Objective:    Blood pressure 114/51, pulse 81, temperature 98 °F (36.7 °C), temperature source Oral, resp. rate 16, height 5' 6\" (1.676 m), weight 117 lb 9.6 oz (53.343 kg), SpO2 96 %.     General appearance: alert, appea 05/23/2017    TROP  1.91*  05/25/2017    B12  377  05/23/2017        Xr Chest Ap Portable  (cpt=71010)    5/26/2017  CONCLUSION:         1.  Cardiomegaly with moderate CHF with bibasilar lung consolidation/atelectasis/effusions slightly worse than May 25, 2 and possible KRISTINE Marshall,   >35 min spent with patient. > 50% time was spent counseling patient, discussing plan of care, discussing labs and imaging findings. Spoke with consultant. All questions answered.          5/26/2017

## 2017-06-02 ENCOUNTER — OFFICE VISIT (OUTPATIENT)
Dept: CARDIOLOGY CLINIC | Facility: HOSPITAL | Age: 62
End: 2017-06-02
Attending: INTERNAL MEDICINE
Payer: COMMERCIAL

## 2017-06-02 VITALS
OXYGEN SATURATION: 98 % | HEART RATE: 95 BPM | SYSTOLIC BLOOD PRESSURE: 133 MMHG | DIASTOLIC BLOOD PRESSURE: 65 MMHG | WEIGHT: 108.88 LBS | BODY MASS INDEX: 18 KG/M2

## 2017-06-02 DIAGNOSIS — K92.2 GASTROINTESTINAL HEMORRHAGE, UNSPECIFIED GASTROINTESTINAL HEMORRHAGE TYPE: ICD-10-CM

## 2017-06-02 DIAGNOSIS — I21.4 NSTEMI (NON-ST ELEVATED MYOCARDIAL INFARCTION) (HCC): ICD-10-CM

## 2017-06-02 DIAGNOSIS — I35.0 SEVERE AORTIC STENOSIS: ICD-10-CM

## 2017-06-02 DIAGNOSIS — I48.91 ATRIAL FIBRILLATION (HCC): Primary | ICD-10-CM

## 2017-06-02 DIAGNOSIS — I50.9 ACUTE CONGESTIVE HEART FAILURE, UNSPECIFIED CONGESTIVE HEART FAILURE TYPE: ICD-10-CM

## 2017-06-02 PROCEDURE — 99214 OFFICE O/P EST MOD 30 MIN: CPT | Performed by: NURSE PRACTITIONER

## 2017-06-02 PROCEDURE — 99211 OFF/OP EST MAY X REQ PHY/QHP: CPT | Performed by: NURSE PRACTITIONER

## 2017-06-02 PROCEDURE — 80048 BASIC METABOLIC PNL TOTAL CA: CPT | Performed by: NURSE PRACTITIONER

## 2017-06-02 PROCEDURE — 85025 COMPLETE CBC W/AUTO DIFF WBC: CPT | Performed by: NURSE PRACTITIONER

## 2017-06-02 PROCEDURE — 36415 COLL VENOUS BLD VENIPUNCTURE: CPT | Performed by: NURSE PRACTITIONER

## 2017-06-02 NOTE — PROGRESS NOTES
[address] [place] [address] Patient Status:  Outpatient    1955 MRN [id number]   Location MD Aguila Siddiqi MD is a 60year old female who pr cp, sob, dizziness, swelling, tolerating walking  And walking up and down 7 stairs several times per day,  cleaning house today without long, cp. Denies any sign of bleeding. Seeing Dr. Des Brown on June 7th. Staying with daughter. Seeing Dr. Car Rodriguez.      Revi non-tender; bowel sounds normal; no masses,  no organomegaly  Extremities: extremities normal, atraumatic, no cyanosis or edema  Pulses: 2+ and symmetric  Neurologic: Grossly normal    Cardiographics:  EC2017 sinus tachycardia 150 bpm  Echocardiogr no abd bloating or LE edema. Severe AS for consultation with Dr. Junior Awan on June 7 th for future AVR. No sign of significant fluid overload. Continue same medications, follow up with Dr. Junior Awan on June 7th and see Dr. Severa Res or his APN in 2-3 weeks.  Aditya

## 2017-06-05 ENCOUNTER — TELEPHONE (OUTPATIENT)
Dept: CARDIOLOGY CLINIC | Facility: CLINIC | Age: 62
End: 2017-06-05

## 2017-06-05 NOTE — TELEPHONE ENCOUNTER
Pt states that she was told to see Dr. Ailyn Robin for hospital follow up within 2 wks. No appts available. Please call.

## 2017-06-08 ENCOUNTER — HOSPITAL ENCOUNTER (OUTPATIENT)
Dept: ULTRASOUND IMAGING | Facility: HOSPITAL | Age: 62
Discharge: HOME | End: 2017-06-08
Attending: PHYSICIAN ASSISTANT
Payer: COMMERCIAL

## 2017-06-08 DIAGNOSIS — I65.23 OCCLUSION AND STENOSIS OF BILATERAL CAROTID ARTERIES: ICD-10-CM

## 2017-06-08 PROCEDURE — 93880 EXTRACRANIAL BILAT STUDY: CPT | Performed by: PHYSICIAN ASSISTANT

## 2017-06-13 ENCOUNTER — TELEPHONE (OUTPATIENT)
Dept: OTHER | Facility: HOSPITAL | Age: 62
End: 2017-06-13

## 2017-06-20 ENCOUNTER — OFFICE VISIT (OUTPATIENT)
Dept: CARDIOLOGY CLINIC | Facility: CLINIC | Age: 62
End: 2017-06-20

## 2017-06-20 ENCOUNTER — LAB ENCOUNTER (OUTPATIENT)
Dept: LAB | Age: 62
End: 2017-06-20
Attending: INTERNAL MEDICINE
Payer: COMMERCIAL

## 2017-06-20 ENCOUNTER — TELEPHONE (OUTPATIENT)
Dept: CARDIOLOGY CLINIC | Facility: CLINIC | Age: 62
End: 2017-06-20

## 2017-06-20 VITALS
SYSTOLIC BLOOD PRESSURE: 150 MMHG | BODY MASS INDEX: 16.88 KG/M2 | HEART RATE: 100 BPM | DIASTOLIC BLOOD PRESSURE: 58 MMHG | WEIGHT: 105 LBS | HEIGHT: 66 IN | RESPIRATION RATE: 18 BRPM

## 2017-06-20 DIAGNOSIS — K92.2 GASTROINTESTINAL HEMORRHAGE, UNSPECIFIED GASTROINTESTINAL HEMORRHAGE TYPE: ICD-10-CM

## 2017-06-20 DIAGNOSIS — I35.0 SEVERE AORTIC STENOSIS: ICD-10-CM

## 2017-06-20 DIAGNOSIS — I35.0 SEVERE AORTIC STENOSIS: Primary | ICD-10-CM

## 2017-06-20 PROCEDURE — 36415 COLL VENOUS BLD VENIPUNCTURE: CPT

## 2017-06-20 PROCEDURE — 99214 OFFICE O/P EST MOD 30 MIN: CPT | Performed by: INTERNAL MEDICINE

## 2017-06-20 PROCEDURE — 99212 OFFICE O/P EST SF 10 MIN: CPT | Performed by: INTERNAL MEDICINE

## 2017-06-20 PROCEDURE — 85025 COMPLETE CBC W/AUTO DIFF WBC: CPT

## 2017-06-20 PROCEDURE — 80048 BASIC METABOLIC PNL TOTAL CA: CPT

## 2017-06-20 RX ORDER — ENALAPRIL MALEATE 2.5 MG/1
2.5 TABLET ORAL 2 TIMES DAILY
Qty: 60 TABLET | Refills: 0 | Status: ON HOLD | OUTPATIENT
Start: 2017-06-20 | End: 2017-07-01

## 2017-06-20 RX ORDER — ENALAPRIL MALEATE 2.5 MG/1
2.5 TABLET ORAL 2 TIMES DAILY
Qty: 60 TABLET | Refills: 0 | Status: SHIPPED | OUTPATIENT
Start: 2017-06-20 | End: 2017-06-20

## 2017-06-20 NOTE — TELEPHONE ENCOUNTER
Pt states that she is having surgery with Dr. Lyric Yen on 6/27/17 and would like to know if she still needs to come to her appt today with Dr. Bradly Julien. Please call.

## 2017-06-20 NOTE — PROGRESS NOTES
Ramona Diez is a 60year old female. Patient presents with:  [facility] F/U: PTCA w/ stent   Palpitations  Aortic Valve Disease: AVR    HPI:   This is a pleasant 65-year-old with severe aortic stenosis and recent GI bleed who presents for follow-up Primary    Relevant Orders    BASIC METABOLIC PANEL (8)    CBC WITH DIFFERENTIAL WITH PLATELET    GI bleed    Relevant Orders    BASIC METABOLIC PANEL (8)    CBC WITH DIFFERENTIAL WITH PLATELET          In conclusion this is a pleasant 25-year-old with aor

## 2017-06-21 ENCOUNTER — ANESTHESIA EVENT (OUTPATIENT)
Dept: SURGERY | Facility: HOSPITAL | Age: 62
DRG: 220 | End: 2017-06-21
Payer: COMMERCIAL

## 2017-06-21 ENCOUNTER — HOSPITAL ENCOUNTER (OUTPATIENT)
Dept: GENERAL RADIOLOGY | Facility: HOSPITAL | Age: 62
Discharge: HOME | End: 2017-06-21
Attending: CLINICAL NURSE SPECIALIST
Payer: COMMERCIAL

## 2017-06-21 DIAGNOSIS — Z01.818 PRE-OPERATIVE CLEARANCE: ICD-10-CM

## 2017-06-21 LAB
ALBUMIN SERPL BCP-MCNC: 4.2 G/DL (ref 3.5–4.8)
ALP SERPL-CCNC: 54 U/L (ref 32–100)
ALT SERPL-CCNC: 15 U/L (ref 14–54)
APTT PPP: 28.1 SECONDS (ref 23.2–35.3)
AST SERPL-CCNC: 19 U/L (ref 15–41)
BACTERIA UR QL AUTO: NEGATIVE /HPF
BILIRUB DIRECT SERPL-MCNC: 0.1 MG/DL (ref 0–0.2)
BILIRUB SERPL-MCNC: 0.8 MG/DL (ref 0.3–1.2)
BILIRUB UR QL: NEGATIVE
COLOR UR: YELLOW
GLUCOSE UR-MCNC: NEGATIVE MG/DL
HBA1C MFR BLD: 5.9 % (ref 4–6)
HGB UR QL STRIP.AUTO: NEGATIVE
INR BLD: 1.1 (ref 0.9–1.2)
KETONES UR-MCNC: NEGATIVE MG/DL
LEUKOCYTE ESTERASE UR QL STRIP.AUTO: NEGATIVE
MAGNESIUM SERPL-MCNC: 2 MG/DL (ref 1.8–2.5)
MRSA NASAL: NEGATIVE
NITRITE UR QL STRIP.AUTO: NEGATIVE
PH UR: 5 [PH] (ref 5–8)
PROT SERPL-MCNC: 7.3 G/DL (ref 5.9–8.4)
PROT UR-MCNC: NEGATIVE MG/DL
PROTHROMBIN TIME: 13.5 SECONDS (ref 11.8–14.5)
RBC #/AREA URNS AUTO: 0 /HPF
SP GR UR STRIP: 1.01 (ref 1–1.03)
STAPH A BY PCR: NEGATIVE
UROBILINOGEN UR STRIP-ACNC: <2
VIT C UR-MCNC: 40 MG/DL
WBC #/AREA URNS AUTO: 2 /HPF

## 2017-06-21 PROCEDURE — 81003 URINALYSIS AUTO W/O SCOPE: CPT | Performed by: PHYSICIAN ASSISTANT

## 2017-06-21 PROCEDURE — 93010 ELECTROCARDIOGRAM REPORT: CPT | Performed by: CLINICAL NURSE SPECIALIST

## 2017-06-21 PROCEDURE — 85730 THROMBOPLASTIN TIME PARTIAL: CPT | Performed by: PHYSICIAN ASSISTANT

## 2017-06-21 PROCEDURE — 83735 ASSAY OF MAGNESIUM: CPT | Performed by: PHYSICIAN ASSISTANT

## 2017-06-21 PROCEDURE — 80076 HEPATIC FUNCTION PANEL: CPT | Performed by: PHYSICIAN ASSISTANT

## 2017-06-21 PROCEDURE — 94667 MNPJ CHEST WALL 1ST: CPT

## 2017-06-21 PROCEDURE — 93005 ELECTROCARDIOGRAM TRACING: CPT

## 2017-06-21 PROCEDURE — 71020 XR CHEST PA + LAT CHEST (CPT=71020): CPT | Performed by: CLINICAL NURSE SPECIALIST

## 2017-06-21 PROCEDURE — 83036 HEMOGLOBIN GLYCOSYLATED A1C: CPT | Performed by: PHYSICIAN ASSISTANT

## 2017-06-21 PROCEDURE — 85610 PROTHROMBIN TIME: CPT | Performed by: PHYSICIAN ASSISTANT

## 2017-06-21 NOTE — DISCHARGE PLANNING
GLENN met with the patient for preadmission testing. She lives alone and has been independent with her care. She will be staying with her daughter in [place] upon d/c from the hospital for additional support.   We discussed d/c plans for [facility] vs. Re

## 2017-06-21 NOTE — ANESTHESIA PREPROCEDURE EVALUATION
Anesthesia PreOp Note    HPI:     Lisa Lang is a 60year old female who presents for preoperative consultation requested by: Leigh Ann Salvador MD    Date of Surgery: 6/27/2017    Procedure(s):   HEART AORTIC VALVE REPAIR/REPLACEMENT  In Drug Use: Not on file    Sexual Activity: Not on file   Not on file  Other Topics Concern   None on file     Social History Narrative       Available pre-op labs reviewed.     Lab Results  Component Value Date   WBC 5.6 06/20/2017   RBC 4.28 06/20/2017   H

## 2017-06-21 NOTE — PROGRESS NOTES
Misc. Note    Pt. Scheduled for AVR with Dr. Carmelo Multani on 6/27. Pre-admission Testing performed today with pt. Written and verbal info provided to pt. Regarding cherelle-op expectations with all questions answered. Consents obtained. Testing in progress.  Informe

## 2017-06-21 NOTE — OR PREOP
[facility] requested Marilee in Dr Chary Estrada office to reflect pt's full name as listed on her 's license.

## 2017-06-26 ENCOUNTER — LAB ENCOUNTER (OUTPATIENT)
Dept: LAB | Facility: HOSPITAL | Age: 62
End: 2017-06-26
Attending: PHYSICIAN ASSISTANT
Payer: COMMERCIAL

## 2017-06-26 DIAGNOSIS — I35.1 AORTIC VALVE REGURGITATION, UNSPECIFIED ETIOLOGY: ICD-10-CM

## 2017-06-26 PROCEDURE — 86900 BLOOD TYPING SEROLOGIC ABO: CPT

## 2017-06-26 PROCEDURE — 86850 RBC ANTIBODY SCREEN: CPT

## 2017-06-26 PROCEDURE — 86901 BLOOD TYPING SEROLOGIC RH(D): CPT

## 2017-06-26 PROCEDURE — 36415 COLL VENOUS BLD VENIPUNCTURE: CPT

## 2017-06-26 PROCEDURE — 86920 COMPATIBILITY TEST SPIN: CPT

## 2017-06-27 ENCOUNTER — ANESTHESIA (OUTPATIENT)
Dept: SURGERY | Facility: HOSPITAL | Age: 62
DRG: 220 | End: 2017-06-27
Payer: COMMERCIAL

## 2017-06-27 ENCOUNTER — SURGERY (OUTPATIENT)
Age: 62
End: 2017-06-27

## 2017-06-27 ENCOUNTER — HOSPITAL ENCOUNTER (INPATIENT)
Facility: HOSPITAL | Age: 62
LOS: 4 days | Discharge: HOME | DRG: 220 | End: 2017-07-01
Attending: THORACIC SURGERY (CARDIOTHORACIC VASCULAR SURGERY) | Admitting: THORACIC SURGERY (CARDIOTHORACIC VASCULAR SURGERY)
Payer: COMMERCIAL

## 2017-06-27 ENCOUNTER — APPOINTMENT (OUTPATIENT)
Dept: GENERAL RADIOLOGY | Facility: HOSPITAL | Age: 62
DRG: 220 | End: 2017-06-27
Attending: CLINICAL NURSE SPECIALIST
Payer: COMMERCIAL

## 2017-06-27 DIAGNOSIS — I35.0 AORTIC VALVE STENOSIS, UNSPECIFIED ETIOLOGY: Primary | ICD-10-CM

## 2017-06-27 LAB
ANION GAP SERPL CALC-SCNC: 8 MMOL/L (ref 0–18)
APTT PPP: 36.9 SECONDS (ref 23.2–35.3)
BASE EXCESS BLD CALC-SCNC: -0.1 MMOL/L (ref ?–2)
BASE EXCESS BLD CALC-SCNC: -1.7 MMOL/L (ref ?–2)
BASE EXCESS BLD CALC-SCNC: -1.8 MMOL/L (ref ?–2)
BASE EXCESS BLD CALC-SCNC: -2.1 MMOL/L (ref ?–2)
BASE EXCESS BLD CALC-SCNC: -2.3 MMOL/L (ref ?–2)
BASE EXCESS BLD CALC-SCNC: -5.6 MMOL/L (ref ?–2)
BASOPHILS # BLD: 0 K/UL (ref 0–0.2)
BASOPHILS NFR BLD: 0 %
BUN SERPL-MCNC: 13 MG/DL (ref 8–20)
BUN/CREAT SERPL: 17.3 (ref 10–20)
CA-I BLD-SCNC: 1.14 MMOL/L (ref 0.95–1.32)
CA-I BLD-SCNC: 1.15 MMOL/L (ref 0.95–1.32)
CA-I BLD-SCNC: 1.17 MMOL/L (ref 0.95–1.32)
CA-I BLD-SCNC: 1.17 MMOL/L (ref 0.95–1.32)
CA-I BLD-SCNC: 1.19 MMOL/L (ref 0.95–1.32)
CA-I BLD-SCNC: 1.26 MMOL/L (ref 0.95–1.32)
CALCIUM SERPL-MCNC: 7.5 MG/DL (ref 8.5–10.5)
CFT BLD TEG: 4.7 MIN (ref 5–10)
CFT BLD TEG: 8.9 MIN (ref 5–10)
CFT P HPASE BLD TEG: 9 MIN (ref 5–10)
CHLORIDE SERPL-SCNC: 113 MMOL/L (ref 95–110)
CLOT ANGLE BLD TEG: 62.9 DEG (ref 53–72)
CLOT ANGLE BLD TEG: 73.1 DEG (ref 53–72)
CLOT ANGLE P HPASE BLD TEG: 61.9 DEG (ref 53–72)
CLOT LYSIS 30M P MA LENFR BLD TEG: 0.2 % (ref 0–8)
CLOT LYSIS 30M P MA LENFR BLD TEG: 0.7 % (ref 0–8)
CLOT LYSIS 30M P MA LENFR BLD TEG: 5.9 % (ref 0–8)
CLOT LYSIS ESTIMATE PRCTL BLD TEG: 4.2 MIN
CLOT LYSIS ESTIMATE PRCTL BLD TEG: 8.4 MIN
CLOT STRENGTH BLD TEG: 1.2 MIN (ref 1–3)
CLOT STRENGTH BLD TEG: 2 MIN (ref 1–3)
CLOT STRENGTH BLD TEG: 4.3 KD/SC (ref 4.5–11)
CLOT STRENGTH BLD TEG: 6.2 KD/SC (ref 4.5–11)
CLOT STRENGTH P HPASE BLD TEG: 2.2 MIN (ref 1–3)
CLOT STRENGTH P HPASE BLD TEG: 5.6 KD/SC (ref 4.5–11)
CLOT STRENGTH P HPASE BLD TEG: 8.5 MIN
CO2 SERPL-SCNC: 22 MMOL/L (ref 22–32)
COHGB MFR BLD: 1.6 % (ref 0–1.5)
COHGB MFR BLD: <1.5 % (ref 0–1.5)
CREAT SERPL-MCNC: 0.75 MG/DL (ref 0.5–1.5)
EOSINOPHIL # BLD: 0 K/UL (ref 0–0.7)
EOSINOPHIL NFR BLD: 0 %
ERYTHROCYTE [DISTWIDTH] IN BLOOD BY AUTOMATED COUNT: 26.4 % (ref 11–15)
ERYTHROCYTE [DISTWIDTH] IN BLOOD BY AUTOMATED COUNT: 26.4 % (ref 11–15)
GLUCOSE BLDC GLUCOMTR-MCNC: 109 MG/DL (ref 70–99)
GLUCOSE BLDC GLUCOMTR-MCNC: 109 MG/DL (ref 70–99)
GLUCOSE BLDC GLUCOMTR-MCNC: 111 MG/DL (ref 70–99)
GLUCOSE BLDC GLUCOMTR-MCNC: 118 MG/DL (ref 70–99)
GLUCOSE BLDC GLUCOMTR-MCNC: 120 MG/DL (ref 70–99)
GLUCOSE BLDC GLUCOMTR-MCNC: 126 MG/DL (ref 70–99)
GLUCOSE BLDC GLUCOMTR-MCNC: 128 MG/DL (ref 70–99)
GLUCOSE BLDC GLUCOMTR-MCNC: 132 MG/DL (ref 70–99)
GLUCOSE BLDC GLUCOMTR-MCNC: 142 MG/DL (ref 70–99)
GLUCOSE BLDC GLUCOMTR-MCNC: 144 MG/DL (ref 70–99)
GLUCOSE BLDC GLUCOMTR-MCNC: 144 MG/DL (ref 70–99)
GLUCOSE BLDC GLUCOMTR-MCNC: 145 MG/DL (ref 70–99)
GLUCOSE BLDC GLUCOMTR-MCNC: 147 MG/DL (ref 70–99)
GLUCOSE BLDC GLUCOMTR-MCNC: 151 MG/DL (ref 70–99)
GLUCOSE BLDC GLUCOMTR-MCNC: 157 MG/DL (ref 70–99)
GLUCOSE BLDC GLUCOMTR-MCNC: 162 MG/DL (ref 70–99)
GLUCOSE BLDC GLUCOMTR-MCNC: 89 MG/DL (ref 70–99)
GLUCOSE SERPL-MCNC: 116 MG/DL (ref 70–99)
HCO3 BLDA-SCNC: 21.1 MEQ/L (ref 21–27)
HCO3 BLDA-SCNC: 21.8 MEQ/L (ref 21–27)
HCO3 BLDA-SCNC: 22.2 MEQ/L (ref 21–27)
HCO3 BLDA-SCNC: 22.9 MEQ/L (ref 21–27)
HCO3 BLDA-SCNC: 23.1 MEQ/L (ref 21–27)
HCO3 BLDA-SCNC: 24.4 MEQ/L (ref 21–27)
HCT VFR BLD AUTO: 30.2 % (ref 35–48)
HCT VFR BLD AUTO: 30.2 % (ref 35–48)
HGB BLD-MCNC: 7 G/DL (ref 12–16)
HGB BLD-MCNC: 7.3 G/DL (ref 12–16)
HGB BLD-MCNC: 7.5 G/DL (ref 12–16)
HGB BLD-MCNC: 9.1 G/DL (ref 12–16)
HGB BLD-MCNC: 9.5 G/DL (ref 12–16)
HGB BLD-MCNC: 9.8 G/DL (ref 12–16)
LYMPHOCYTES # BLD: 1.4 K/UL (ref 1–4)
LYMPHOCYTES NFR BLD: 10 %
MAGNESIUM SERPL-MCNC: 2.8 MG/DL (ref 1.8–2.5)
MAGNESIUM SERPL-MCNC: 2.9 MG/DL (ref 1.8–2.5)
MCF BLD TEG: 35.5 MM
MCF BLD TEG: 46 MM (ref 50–70)
MCF BLD TEG: 51.6 MM
MCF BLD TEG: 55.3 MM (ref 50–70)
MCF P HPASE BLD TEG: 52.9 MM (ref 50–70)
MCH RBC QN AUTO: 24.8 PG (ref 27–32)
MCH RBC QN AUTO: 24.8 PG (ref 27–32)
MCHC RBC AUTO-ENTMCNC: 32.6 G/DL (ref 32–37)
MCHC RBC AUTO-ENTMCNC: 32.6 G/DL (ref 32–37)
MCV RBC AUTO: 76.1 FL (ref 80–100)
MCV RBC AUTO: 76.1 FL (ref 80–100)
METHGB MFR BLD: 0.2 % SAT (ref 0.4–1.5)
METHGB MFR BLD: 0.7 % SAT (ref 0.4–1.5)
METHGB MFR BLD: 1.1 % SAT (ref 0.4–1.5)
METHGB MFR BLD: 1.2 % SAT (ref 0.4–1.5)
METHGB MFR BLD: 1.3 % SAT (ref 0.4–1.5)
METHGB MFR BLD: 1.5 % SAT (ref 0.4–1.5)
MONOCYTES # BLD: 0.3 K/UL (ref 0–1)
MONOCYTES NFR BLD: 2 %
NEUTROPHILS # BLD AUTO: 12 K/UL (ref 1.8–7.7)
NEUTROPHILS NFR BLD: 87 %
NITRIC OXIDE: 40 PPM
O2 CT BLD-SCNC: 10.8 VOL% (ref 15–23)
O2 CT BLD-SCNC: 11.2 VOL% (ref 15–23)
O2 CT BLD-SCNC: 11.6 VOL% (ref 15–23)
O2 CT BLD-SCNC: 13 VOL% (ref 15–23)
O2 CT BLD-SCNC: 13.7 VOL% (ref 15–23)
O2 CT BLD-SCNC: 14.2 VOL% (ref 15–23)
O2/TOTAL GAS SETTING VFR VENT: 50 %
OSMOLALITY UR CALC.SUM OF ELEC: 297 MOSM/KG (ref 275–295)
PCO2 BLDA: 37 MM HG (ref 35–45)
PCO2 BLDA: 37 MM HG (ref 35–45)
PCO2 BLDA: 41 MM HG (ref 35–45)
PCO2 BLDA: 42 MM HG (ref 35–45)
PCO2 BLDA: 44 MM HG (ref 35–45)
PCO2 BLDA: 52 MM HG (ref 35–45)
PEEP SETTING VENT: 5 CM H2O
PEEP SETTING VENT: 5 CM H2O
PH BLDA: 7.23 [PH] (ref 7.35–7.45)
PH BLDA: 7.34 [PH] (ref 7.35–7.45)
PH BLDA: 7.37 [PH] (ref 7.35–7.45)
PH BLDA: 7.38 [PH] (ref 7.35–7.45)
PH BLDA: 7.39 [PH] (ref 7.35–7.45)
PH BLDA: 7.4 [PH] (ref 7.35–7.45)
PLATELET # BLD AUTO: 57 K/UL (ref 140–400)
PLATELET # BLD AUTO: 57 K/UL (ref 140–400)
PLATELET MAPPING % INHIBITION (AA): 44 %
PLATELET MAPPING % INHIBITION (ADP): 8 %
PMV BLD AUTO: 8.9 FL (ref 7.4–10.3)
PMV BLD AUTO: 8.9 FL (ref 7.4–10.3)
PO2 BLDA: 188 MM HG (ref 80–100)
PO2 BLDA: 243 MM HG (ref 80–100)
PO2 BLDA: 298 MM HG (ref 80–100)
PO2 BLDA: 327 MM HG (ref 80–100)
PO2 BLDA: 415 MM HG (ref 80–100)
PO2 BLDA: 515 MM HG (ref 80–100)
PO2 SATN ADJ TO 0.5 BLD: 26 MMHG (ref 24–28)
PO2 SATN ADJ TO 0.5 BLD: 27 MMHG (ref 24–28)
PO2 SATN ADJ TO 0.5 BLD: 28 MMHG (ref 24–28)
PO2 SATN ADJ TO 0.5 BLD: 32 MMHG (ref 24–28)
POTASSIUM (BLOOD GAS): 3.9 MMOL/L (ref 3.3–5.1)
POTASSIUM (BLOOD GAS): 4.1 MMOL/L (ref 3.3–5.1)
POTASSIUM (BLOOD GAS): 4.2 MMOL/L (ref 3.3–5.1)
POTASSIUM (BLOOD GAS): 4.4 MMOL/L (ref 3.3–5.1)
POTASSIUM (BLOOD GAS): 4.6 MMOL/L (ref 3.3–5.1)
POTASSIUM (BLOOD GAS): 5.2 MMOL/L (ref 3.3–5.1)
POTASSIUM SERPL-SCNC: 3.8 MMOL/L (ref 3.3–5.1)
PRESSURE SUPPORT SETTING VENT: 10 CM H2O
PRESSURE SUPPORT SETTING VENT: 5 CM H2O
PUNCTURE CHARGE: NO
RBC # BLD AUTO: 3.96 M/UL (ref 3.7–5.4)
RBC # BLD AUTO: 3.96 M/UL (ref 3.7–5.4)
RESP RATE: 13 BPM
SAO2 % BLDA: >98.5 % (ref 94–100)
SODIUM (BLOOD GAS): 145 MMOL/L (ref 135–145)
SODIUM (BLOOD GAS): 147 MMOL/L (ref 135–145)
SODIUM (BLOOD GAS): 147 MMOL/L (ref 135–145)
SODIUM (BLOOD GAS): 148 MMOL/L (ref 135–145)
SODIUM (BLOOD GAS): 150 MMOL/L (ref 135–145)
SODIUM (BLOOD GAS): 150 MMOL/L (ref 135–145)
SODIUM SERPL-SCNC: 143 MMOL/L (ref 136–144)
SPECIMEN VOL 24H UR: 400 ML
TSH SERPL-ACNC: 0.62 UIU/ML (ref 0.45–5.33)
WBC # BLD AUTO: 13.8 K/UL (ref 4–11)
WBC # BLD AUTO: 13.8 K/UL (ref 4–11)

## 2017-06-27 PROCEDURE — 5A1223Z PERFORMANCE OF CARDIAC PACING, CONTINUOUS: ICD-10-PCS | Performed by: THORACIC SURGERY (CARDIOTHORACIC VASCULAR SURGERY)

## 2017-06-27 PROCEDURE — 5A1945Z RESPIRATORY VENTILATION, 24-96 CONSECUTIVE HOURS: ICD-10-PCS | Performed by: HOSPITALIST

## 2017-06-27 PROCEDURE — 30233N1 TRANSFUSION OF NONAUTOLOGOUS RED BLOOD CELLS INTO PERIPHERAL VEIN, PERCUTANEOUS APPROACH: ICD-10-PCS | Performed by: HOSPITALIST

## 2017-06-27 PROCEDURE — 05HM33Z INSERTION OF INFUSION DEVICE INTO RIGHT INTERNAL JUGULAR VEIN, PERCUTANEOUS APPROACH: ICD-10-PCS | Performed by: THORACIC SURGERY (CARDIOTHORACIC VASCULAR SURGERY)

## 2017-06-27 PROCEDURE — 99255 IP/OBS CONSLTJ NEW/EST HI 80: CPT | Performed by: INTERNAL MEDICINE

## 2017-06-27 PROCEDURE — 93312 ECHO TRANSESOPHAGEAL: CPT | Performed by: ANESTHESIOLOGY

## 2017-06-27 PROCEDURE — 02RF08Z REPLACEMENT OF AORTIC VALVE WITH ZOOPLASTIC TISSUE, OPEN APPROACH: ICD-10-PCS | Performed by: THORACIC SURGERY (CARDIOTHORACIC VASCULAR SURGERY)

## 2017-06-27 PROCEDURE — 4A133B1 MONITORING OF ARTERIAL PRESSURE, PERIPHERAL, PERCUTANEOUS APPROACH: ICD-10-PCS | Performed by: THORACIC SURGERY (CARDIOTHORACIC VASCULAR SURGERY)

## 2017-06-27 PROCEDURE — 71010 XR CHEST AP PORTABLE  (CPT=71010): CPT | Performed by: CLINICAL NURSE SPECIALIST

## 2017-06-27 PROCEDURE — B246ZZ4 ULTRASONOGRAPHY OF RIGHT AND LEFT HEART, TRANSESOPHAGEAL: ICD-10-PCS | Performed by: THORACIC SURGERY (CARDIOTHORACIC VASCULAR SURGERY)

## 2017-06-27 PROCEDURE — 0BH17EZ INSERTION OF ENDOTRACHEAL AIRWAY INTO TRACHEA, VIA NATURAL OR ARTIFICIAL OPENING: ICD-10-PCS | Performed by: HOSPITALIST

## 2017-06-27 DEVICE — VALVE AORTIC MAGNA EASE 21MM: Type: IMPLANTABLE DEVICE | Site: HEART | Status: FUNCTIONAL

## 2017-06-27 RX ORDER — HEPARIN SODIUM 1000 [USP'U]/ML
INJECTION, SOLUTION INTRAVENOUS; SUBCUTANEOUS AS NEEDED
Status: DISCONTINUED | OUTPATIENT
Start: 2017-06-27 | End: 2017-06-27 | Stop reason: HOSPADM

## 2017-06-27 RX ORDER — SODIUM CHLORIDE 9 MG/ML
INJECTION, SOLUTION INTRAVENOUS CONTINUOUS
Status: DISCONTINUED | OUTPATIENT
Start: 2017-06-27 | End: 2017-06-28

## 2017-06-27 RX ORDER — NEOSTIGMINE METHYLSULFATE 0.5 MG/ML
2 INJECTION INTRAVENOUS ONCE
Status: COMPLETED | OUTPATIENT
Start: 2017-06-27 | End: 2017-06-27

## 2017-06-27 RX ORDER — HYDROCODONE BITARTRATE AND ACETAMINOPHEN 5; 325 MG/1; MG/1
1 TABLET ORAL EVERY 4 HOURS PRN
Status: DISCONTINUED | OUTPATIENT
Start: 2017-06-27 | End: 2017-07-01

## 2017-06-27 RX ORDER — HYDROCODONE BITARTRATE AND ACETAMINOPHEN 5; 325 MG/1; MG/1
2 TABLET ORAL EVERY 4 HOURS PRN
Status: DISCONTINUED | OUTPATIENT
Start: 2017-06-27 | End: 2017-07-01

## 2017-06-27 RX ORDER — NITROGLYCERIN 20 MG/100ML
INJECTION INTRAVENOUS CONTINUOUS PRN
Status: DISCONTINUED | OUTPATIENT
Start: 2017-06-27 | End: 2017-06-29

## 2017-06-27 RX ORDER — DOXEPIN HYDROCHLORIDE 50 MG/1
1 CAPSULE ORAL DAILY
Status: DISCONTINUED | OUTPATIENT
Start: 2017-06-28 | End: 2017-07-01

## 2017-06-27 RX ORDER — CHLORHEXIDINE GLUCONATE 0.12 MG/ML
15 RINSE ORAL
Status: DISCONTINUED | OUTPATIENT
Start: 2017-06-27 | End: 2017-07-01

## 2017-06-27 RX ORDER — FAMOTIDINE 10 MG/ML
20 INJECTION, SOLUTION INTRAVENOUS 2 TIMES DAILY
Status: DISCONTINUED | OUTPATIENT
Start: 2017-06-27 | End: 2017-07-01

## 2017-06-27 RX ORDER — POTASSIUM CHLORIDE 29.8 MG/ML
40 INJECTION INTRAVENOUS AS NEEDED
Status: DISCONTINUED | OUTPATIENT
Start: 2017-06-27 | End: 2017-07-01

## 2017-06-27 RX ORDER — MAGNESIUM SULFATE HEPTAHYDRATE 40 MG/ML
2 INJECTION, SOLUTION INTRAVENOUS AS NEEDED
Status: DISCONTINUED | OUTPATIENT
Start: 2017-06-27 | End: 2017-07-01

## 2017-06-27 RX ORDER — HEPARIN SODIUM 1000 [USP'U]/ML
INJECTION, SOLUTION INTRAVENOUS; SUBCUTANEOUS AS NEEDED
Status: DISCONTINUED | OUTPATIENT
Start: 2017-06-27 | End: 2017-06-27 | Stop reason: SURG

## 2017-06-27 RX ORDER — LORAZEPAM 1 MG/1
1 TABLET ORAL ONCE
Status: COMPLETED | OUTPATIENT
Start: 2017-06-27 | End: 2017-06-27

## 2017-06-27 RX ORDER — DOBUTAMINE HYDROCHLORIDE 100 MG/100ML
INJECTION INTRAVENOUS CONTINUOUS PRN
Status: DISCONTINUED | OUTPATIENT
Start: 2017-06-27 | End: 2017-06-27 | Stop reason: SURG

## 2017-06-27 RX ORDER — MORPHINE SULFATE 2 MG/ML
2 INJECTION, SOLUTION INTRAMUSCULAR; INTRAVENOUS ONCE
Status: COMPLETED | OUTPATIENT
Start: 2017-06-27 | End: 2017-06-27

## 2017-06-27 RX ORDER — SODIUM CHLORIDE 9 MG/ML
83 INJECTION, SOLUTION INTRAVENOUS CONTINUOUS
Status: DISCONTINUED | OUTPATIENT
Start: 2017-06-27 | End: 2017-06-28

## 2017-06-27 RX ORDER — ACETAMINOPHEN 325 MG/1
650 TABLET ORAL EVERY 4 HOURS PRN
Status: DISCONTINUED | OUTPATIENT
Start: 2017-06-27 | End: 2017-07-01

## 2017-06-27 RX ORDER — ROCURONIUM BROMIDE 10 MG/ML
INJECTION, SOLUTION INTRAVENOUS AS NEEDED
Status: DISCONTINUED | OUTPATIENT
Start: 2017-06-27 | End: 2017-06-27 | Stop reason: SURG

## 2017-06-27 RX ORDER — ENOXAPARIN SODIUM 100 MG/ML
40 INJECTION SUBCUTANEOUS DAILY
Status: DISCONTINUED | OUTPATIENT
Start: 2017-06-28 | End: 2017-06-28

## 2017-06-27 RX ORDER — DEXTROSE, SODIUM CHLORIDE, SODIUM LACTATE, POTASSIUM CHLORIDE, AND CALCIUM CHLORIDE 5; .6; .31; .03; .02 G/100ML; G/100ML; G/100ML; G/100ML; G/100ML
INJECTION, SOLUTION INTRAVENOUS CONTINUOUS
Status: DISCONTINUED | OUTPATIENT
Start: 2017-06-27 | End: 2017-06-28

## 2017-06-27 RX ORDER — ONDANSETRON 2 MG/ML
4 INJECTION INTRAMUSCULAR; INTRAVENOUS EVERY 6 HOURS PRN
Status: DISCONTINUED | OUTPATIENT
Start: 2017-06-27 | End: 2017-07-01

## 2017-06-27 RX ORDER — FAMOTIDINE 20 MG/1
20 TABLET ORAL ONCE
Status: COMPLETED | OUTPATIENT
Start: 2017-06-27 | End: 2017-06-27

## 2017-06-27 RX ORDER — TEMAZEPAM 15 MG/1
15 CAPSULE ORAL NIGHTLY PRN
Status: DISCONTINUED | OUTPATIENT
Start: 2017-06-27 | End: 2017-07-01

## 2017-06-27 RX ORDER — MORPHINE SULFATE 4 MG/ML
4 INJECTION, SOLUTION INTRAMUSCULAR; INTRAVENOUS EVERY 2 HOUR PRN
Status: DISCONTINUED | OUTPATIENT
Start: 2017-06-27 | End: 2017-07-01

## 2017-06-27 RX ORDER — BISACODYL 10 MG
10 SUPPOSITORY, RECTAL RECTAL
Status: DISCONTINUED | OUTPATIENT
Start: 2017-06-27 | End: 2017-07-01

## 2017-06-27 RX ORDER — MORPHINE SULFATE 2 MG/ML
2 INJECTION, SOLUTION INTRAMUSCULAR; INTRAVENOUS EVERY 2 HOUR PRN
Status: DISCONTINUED | OUTPATIENT
Start: 2017-06-27 | End: 2017-07-01

## 2017-06-27 RX ORDER — MORPHINE SULFATE 2 MG/ML
2 INJECTION, SOLUTION INTRAMUSCULAR; INTRAVENOUS
Status: DISCONTINUED | OUTPATIENT
Start: 2017-06-27 | End: 2017-06-30

## 2017-06-27 RX ORDER — FAMOTIDINE 20 MG/1
20 TABLET ORAL 2 TIMES DAILY
Status: DISCONTINUED | OUTPATIENT
Start: 2017-06-27 | End: 2017-07-01

## 2017-06-27 RX ORDER — METOCLOPRAMIDE HYDROCHLORIDE 5 MG/ML
10 INJECTION INTRAMUSCULAR; INTRAVENOUS EVERY 6 HOURS
Status: DISCONTINUED | OUTPATIENT
Start: 2017-06-27 | End: 2017-07-01

## 2017-06-27 RX ORDER — CEFAZOLIN SODIUM 1 G/3ML
INJECTION, POWDER, FOR SOLUTION INTRAMUSCULAR; INTRAVENOUS AS NEEDED
Status: DISCONTINUED | OUTPATIENT
Start: 2017-06-27 | End: 2017-06-27 | Stop reason: HOSPADM

## 2017-06-27 RX ORDER — DOBUTAMINE HYDROCHLORIDE 100 MG/100ML
INJECTION INTRAVENOUS CONTINUOUS PRN
Status: DISCONTINUED | OUTPATIENT
Start: 2017-06-27 | End: 2017-07-01

## 2017-06-27 RX ORDER — MORPHINE SULFATE 4 MG/ML
4 INJECTION, SOLUTION INTRAMUSCULAR; INTRAVENOUS
Status: DISCONTINUED | OUTPATIENT
Start: 2017-06-27 | End: 2017-06-30

## 2017-06-27 RX ORDER — SODIUM CHLORIDE 9 MG/ML
INJECTION, SOLUTION INTRAVENOUS CONTINUOUS
Status: DISCONTINUED | OUTPATIENT
Start: 2017-06-27 | End: 2017-07-01

## 2017-06-27 RX ORDER — ASCORBIC ACID 500 MG
1000 TABLET ORAL ONCE
Status: COMPLETED | OUTPATIENT
Start: 2017-06-27 | End: 2017-06-27

## 2017-06-27 RX ORDER — ACETAMINOPHEN 10 MG/ML
1000 INJECTION, SOLUTION INTRAVENOUS EVERY 8 HOURS
Status: COMPLETED | OUTPATIENT
Start: 2017-06-27 | End: 2017-06-28

## 2017-06-27 RX ORDER — POTASSIUM CHLORIDE 14.9 MG/ML
20 INJECTION INTRAVENOUS AS NEEDED
Status: DISCONTINUED | OUTPATIENT
Start: 2017-06-27 | End: 2017-07-01

## 2017-06-27 RX ORDER — PROTAMINE SULFATE 10 MG/ML
INJECTION, SOLUTION INTRAVENOUS AS NEEDED
Status: DISCONTINUED | OUTPATIENT
Start: 2017-06-27 | End: 2017-06-27 | Stop reason: SURG

## 2017-06-27 RX ORDER — METOCLOPRAMIDE 10 MG/1
10 TABLET ORAL ONCE
Status: COMPLETED | OUTPATIENT
Start: 2017-06-27 | End: 2017-06-27

## 2017-06-27 RX ORDER — MILRINONE LACTATE 0.2 MG/ML
0.38 INJECTION, SOLUTION INTRAVENOUS AS NEEDED
Status: DISCONTINUED | OUTPATIENT
Start: 2017-06-27 | End: 2017-06-29

## 2017-06-27 RX ORDER — MORPHINE SULFATE 4 MG/ML
8 INJECTION, SOLUTION INTRAMUSCULAR; INTRAVENOUS EVERY 2 HOUR PRN
Status: DISCONTINUED | OUTPATIENT
Start: 2017-06-27 | End: 2017-07-01

## 2017-06-27 RX ORDER — ASCORBIC ACID 500 MG
500 TABLET ORAL 3 TIMES DAILY
Status: DISCONTINUED | OUTPATIENT
Start: 2017-06-28 | End: 2017-07-01

## 2017-06-27 RX ORDER — FIBRINOGEN HUMAN AND THROMBIN HUMAN 1 ML
KIT TOPICAL AS NEEDED
Status: DISCONTINUED | OUTPATIENT
Start: 2017-06-27 | End: 2017-06-27 | Stop reason: HOSPADM

## 2017-06-27 RX ORDER — MAGNESIUM SULFATE 1 G/100ML
1 INJECTION INTRAVENOUS AS NEEDED
Status: DISCONTINUED | OUTPATIENT
Start: 2017-06-27 | End: 2017-07-01

## 2017-06-27 RX ORDER — SODIUM CHLORIDE 0.9 % (FLUSH) 0.9 %
10 SYRINGE (ML) INJECTION AS NEEDED
Status: DISCONTINUED | OUTPATIENT
Start: 2017-06-27 | End: 2017-07-01

## 2017-06-27 RX ORDER — SODIUM CHLORIDE 9 MG/ML
INJECTION, SOLUTION INTRAVENOUS CONTINUOUS PRN
Status: DISCONTINUED | OUTPATIENT
Start: 2017-06-27 | End: 2017-06-27 | Stop reason: SURG

## 2017-06-27 RX ORDER — GLYCOPYRROLATE 0.2 MG/ML
0.4 INJECTION, SOLUTION INTRAMUSCULAR; INTRAVENOUS ONCE
Status: COMPLETED | OUTPATIENT
Start: 2017-06-27 | End: 2017-06-27

## 2017-06-27 RX ORDER — ALBUMIN, HUMAN INJ 5% 5 %
250 SOLUTION INTRAVENOUS ONCE AS NEEDED
Status: COMPLETED | OUTPATIENT
Start: 2017-06-27 | End: 2017-06-27

## 2017-06-27 RX ORDER — ALBUMIN, HUMAN INJ 5% 5 %
250 SOLUTION INTRAVENOUS ONCE
Status: COMPLETED | OUTPATIENT
Start: 2017-06-27 | End: 2017-06-27

## 2017-06-27 RX ORDER — ASPIRIN 325 MG
325 TABLET, DELAYED RELEASE (ENTERIC COATED) ORAL DAILY
Status: DISCONTINUED | OUTPATIENT
Start: 2017-06-28 | End: 2017-06-30

## 2017-06-27 RX ADMIN — HEPARIN SODIUM 19000 UNITS: 1000 INJECTION, SOLUTION INTRAVENOUS; SUBCUTANEOUS at 08:39:00

## 2017-06-27 RX ADMIN — SODIUM CHLORIDE: 9 INJECTION, SOLUTION INTRAVENOUS at 09:58:00

## 2017-06-27 RX ADMIN — ROCURONIUM BROMIDE 10 MG: 10 INJECTION, SOLUTION INTRAVENOUS at 08:51:00

## 2017-06-27 RX ADMIN — SODIUM CHLORIDE: 9 INJECTION, SOLUTION INTRAVENOUS at 07:25:00

## 2017-06-27 RX ADMIN — SODIUM CHLORIDE: 9 INJECTION, SOLUTION INTRAVENOUS at 09:57:00

## 2017-06-27 RX ADMIN — PROTAMINE SULFATE 310 MG: 10 INJECTION, SOLUTION INTRAVENOUS at 10:41:00

## 2017-06-27 RX ADMIN — SODIUM CHLORIDE: 9 INJECTION, SOLUTION INTRAVENOUS at 06:50:00

## 2017-06-27 RX ADMIN — PROTAMINE SULFATE 50 MG: 10 INJECTION, SOLUTION INTRAVENOUS at 11:12:00

## 2017-06-27 RX ADMIN — ROCURONIUM BROMIDE 30 MG: 10 INJECTION, SOLUTION INTRAVENOUS at 07:31:00

## 2017-06-27 RX ADMIN — SODIUM CHLORIDE: 9 INJECTION, SOLUTION INTRAVENOUS at 08:05:00

## 2017-06-27 RX ADMIN — DOBUTAMINE HYDROCHLORIDE 1 MCG/KG/MIN: 100 INJECTION INTRAVENOUS at 10:54:00

## 2017-06-27 RX ADMIN — DOBUTAMINE HYDROCHLORIDE 2.5 MCG/KG/MIN: 100 INJECTION INTRAVENOUS at 11:26:00

## 2017-06-27 RX ADMIN — SODIUM CHLORIDE: 9 INJECTION, SOLUTION INTRAVENOUS at 09:56:00

## 2017-06-27 RX ADMIN — ROCURONIUM BROMIDE 10 MG: 10 INJECTION, SOLUTION INTRAVENOUS at 09:35:00

## 2017-06-27 RX ADMIN — DOBUTAMINE HYDROCHLORIDE 1 MCG/KG/MIN: 100 INJECTION INTRAVENOUS at 10:03:00

## 2017-06-27 RX ADMIN — SODIUM CHLORIDE: 9 INJECTION, SOLUTION INTRAVENOUS at 12:05:00

## 2017-06-27 RX ADMIN — DOBUTAMINE HYDROCHLORIDE 2 MCG/KG/MIN: 100 INJECTION INTRAVENOUS at 10:29:00

## 2017-06-27 RX ADMIN — SODIUM CHLORIDE: 9 INJECTION, SOLUTION INTRAVENOUS at 10:51:00

## 2017-06-27 NOTE — H&P (VIEW-ONLY)
[facility]    Report of Consultation    Ross Escobedo     1955 MRN [id number]   Referring Provider Cristobal Altman MD  [address] [place] [address] PCP Cathy Arce     Date of Consult:  17  Reason for Consultati Current Medications:    Current Outpatient Prescriptions:  Enalapril Maleate 2.5 MG Oral Tab Take 1 tablet (2.5 mg total) by mouth 2 (two) times daily.  Disp: 60 tablet Rfl: 0   Pantoprazole Sodium 40 MG Oral Tab EC Take 1 tablet (40 mg total) by mouth 1.91* 05/25/2017   B12 377 05/23/2017         Imaging:  CARDIAC CATH  CORONARY ARTERIES:   The coronary circulation is right dominant. The left main  bifurcates normally into the LAD and circumflex. The left anterior descending  gives rise to 2 diagonals.

## 2017-06-27 NOTE — OPERATIVE REPORT
[facility] OPERATING ROOM  Operative Note     Petar Memos Location: OR   [facility] [id number] N [id number]   Admission Date 6/27/2017 Operation Date 6/27/2017   Attending Physician Nerissa Skiff, MD Operating Physician Lizy Grijalva Protection with care to keep ice away from the phrenic nerves. Myocardial temperature was monitored and kept below 15° with additional doses of cardioplegia throughout the procedure. A transverse aortotomy was performed and the valve was examined.   It wa condition.        Sarah Costa MD  6/27/2017  11:53 AM

## 2017-06-27 NOTE — ANESTHESIA PROCEDURE NOTES
Arterial Line  Performed by: Cort Galeazzi by: Sarahi Thomas     Procedure Start:  6/27/2017 7:05 AM  Procedure End:  6/27/2017 7:25 AM  Site Identification: real time ultrasound guided and image stored and retrievable    Patient Lennox Donning

## 2017-06-27 NOTE — ANESTHESIA PROCEDURE NOTES
Central Line  Performed by: Vera Ha by: Peter Redd     Procedure Start:  6/27/2017 7:45 AM  Procedure End:  6/27/2017 8:00 AM  Site Identification: real time ultrasound guided and image stored and retrievable    Patient Locatio

## 2017-06-27 NOTE — CONSULTS
[facility]    Report of Consultation    [address] Patient Status:  Inpatient    1955 MRN [id number]   Location [facility] 2W/SW Attending Ariel Maldonado MD   Hosp Day # 0 DEB Hankins     Date o (DOBUTREX) 250 mg/250 ml infusion 2.5-10 mcg/kg/min Intravenous Continuous PRN   nitroGLYCERIN infusion 50mg in D5W 250ml 5-300 mcg/min Intravenous Continuous PRN   norepinephrine (LEVOPHED) 4 mg/250 ml premix infusion 0.5-30 mcg/min Intravenous Continuous morphINE sulfate (PF) 2 MG/ML injection 2 mg 2 mg Intravenous Q2H PRN   Or      morphINE sulfate (PF) 4 MG/ML injection 4 mg 4 mg Intravenous Q2H PRN   Or      morphINE sulfate (PF) 4 MG/ML injection 8 mg 8 mg Intravenous Q2H PRN   [START ON 6/28/2017] a HCT 30.2 (L) 06/27/2017   HCT 30.2 (L) 06/27/2017   PLT 57 (L) 06/27/2017   PLT 57 (L) 06/27/2017   CREATSERUM 0.75 06/27/2017   BUN 13 06/27/2017    06/27/2017   K 3.8 06/27/2017    (H) 06/27/2017   CO2 22 06/27/2017    (H) 06/27/2017

## 2017-06-27 NOTE — PROGRESS NOTES
[facility]    Cardiology Progress Note  [facility]    [address] Patient Status:  Inpatient    1955 MRN [id number]   Location [facility] 2W/SW Attending MD Huong Kaufman 1400   • DOBUTamine 1 mcg/kg/min (06/27/17 1400)   • Nitroglycerin in D5W     • norepinephrine 0.5 mcg/min (06/27/17 8799)   • nitroprusside (NIPRIDE) 50 mg in D5W infusion     • Dextrose in Lactated Ringers 40 mL/hr at 06/27/17 1400   • insulin regular 1. compared with ECG of 06/21/2017 13:33:33 No significant changes have occurred Electronically signed on 06/27/2017 at 14:59 by MD Kim Carson MD  6/27/2017

## 2017-06-27 NOTE — PLAN OF CARE
Problem: Patient/Family Goals  Goal: Patient/Family Short Term Goal  Patient's Short Term Goal: extubate.   Interventions: CPAP then extubate  - See additional Care Plan goals for specific interventions   Patient's goal for today was to remove ETT, goal ach difficulty  - Respiratory Therapy support as indicated  - Manage/alleviate anxiety  - Monitor for signs/symptoms of CO2 retention   Outcome: Progressing  Lungs are CTA, scant clear secretions, chest Xray confirmed ETT placement.  Passed CPAP, extubated at 1 nutritional intake and initiate nutrition consult as needed  - Instruct patient on self management of diabetes   Insulin drip maintained, see doc flowsheets for titrations. A1C 5.9%.

## 2017-06-27 NOTE — INTERVAL H&P NOTE
Above H&P reviewed. Patient saw dentist since previous consult. Patient underwent treatment including several teeth extractions. Patient recently saw the dentist for follow up on 6/23 and was cleared for cardiac surgery.  Will proceed with AVR with bioprost

## 2017-06-27 NOTE — ANESTHESIA POSTPROCEDURE EVALUATION
Patient: Ally Sotelo    Procedure Summary     Date:  06/27/17 Room / Location:  [facility] OR  / [facility] OR    Anesthesia Start:  8431 Anesthesia Stop:      Procedure:  HEART AORTIC VALVE REPAIR/REPLACEMENT (N/A ) Diagnosis:  (Aortic sten

## 2017-06-27 NOTE — OR NURSING
Called Tori (Daughter) to inform her of start of surgery. I informed her that either we would call her or Dr Lyric Yen would talk to her in the CCU waiting area when finishing up with surgery.

## 2017-06-28 ENCOUNTER — APPOINTMENT (OUTPATIENT)
Dept: GENERAL RADIOLOGY | Facility: HOSPITAL | Age: 62
DRG: 220 | End: 2017-06-28
Attending: CLINICAL NURSE SPECIALIST
Payer: COMMERCIAL

## 2017-06-28 LAB
ANION GAP SERPL CALC-SCNC: 8 MMOL/L (ref 0–18)
BASOPHILS # BLD: 0 K/UL (ref 0–0.2)
BASOPHILS NFR BLD: 0 %
BUN SERPL-MCNC: 13 MG/DL (ref 8–20)
BUN/CREAT SERPL: 21 (ref 10–20)
CALCIUM SERPL-MCNC: 8.7 MG/DL (ref 8.5–10.5)
CHLORIDE SERPL-SCNC: 110 MMOL/L (ref 95–110)
CO2 SERPL-SCNC: 23 MMOL/L (ref 22–32)
CREAT SERPL-MCNC: 0.62 MG/DL (ref 0.5–1.5)
EOSINOPHIL # BLD: 0 K/UL (ref 0–0.7)
EOSINOPHIL NFR BLD: 0 %
ERYTHROCYTE [DISTWIDTH] IN BLOOD BY AUTOMATED COUNT: 26.6 % (ref 11–15)
GLUCOSE BLDC GLUCOMTR-MCNC: 108 MG/DL (ref 70–99)
GLUCOSE BLDC GLUCOMTR-MCNC: 113 MG/DL (ref 70–99)
GLUCOSE BLDC GLUCOMTR-MCNC: 115 MG/DL (ref 70–99)
GLUCOSE BLDC GLUCOMTR-MCNC: 115 MG/DL (ref 70–99)
GLUCOSE BLDC GLUCOMTR-MCNC: 116 MG/DL (ref 70–99)
GLUCOSE BLDC GLUCOMTR-MCNC: 119 MG/DL (ref 70–99)
GLUCOSE BLDC GLUCOMTR-MCNC: 120 MG/DL (ref 70–99)
GLUCOSE BLDC GLUCOMTR-MCNC: 123 MG/DL (ref 70–99)
GLUCOSE BLDC GLUCOMTR-MCNC: 124 MG/DL (ref 70–99)
GLUCOSE BLDC GLUCOMTR-MCNC: 125 MG/DL (ref 70–99)
GLUCOSE BLDC GLUCOMTR-MCNC: 131 MG/DL (ref 70–99)
GLUCOSE BLDC GLUCOMTR-MCNC: 134 MG/DL (ref 70–99)
GLUCOSE BLDC GLUCOMTR-MCNC: 142 MG/DL (ref 70–99)
GLUCOSE BLDC GLUCOMTR-MCNC: 158 MG/DL (ref 70–99)
GLUCOSE BLDC GLUCOMTR-MCNC: 187 MG/DL (ref 70–99)
GLUCOSE SERPL-MCNC: 108 MG/DL (ref 70–99)
HCT VFR BLD AUTO: 26.4 % (ref 35–48)
HEPARIN AB PPP QL PL AGG: NEGATIVE
HGB BLD-MCNC: 8.5 G/DL (ref 12–16)
LYMPHOCYTES # BLD: 0.8 K/UL (ref 1–4)
LYMPHOCYTES NFR BLD: 6 %
MAGNESIUM SERPL-MCNC: 2.3 MG/DL (ref 1.8–2.5)
MCH RBC QN AUTO: 24.9 PG (ref 27–32)
MCHC RBC AUTO-ENTMCNC: 32.3 G/DL (ref 32–37)
MCV RBC AUTO: 77.1 FL (ref 80–100)
MONOCYTES # BLD: 0.7 K/UL (ref 0–1)
MONOCYTES NFR BLD: 5 %
NEUTROPHILS # BLD AUTO: 11.7 K/UL (ref 1.8–7.7)
NEUTROPHILS NFR BLD: 88 %
OSMOLALITY UR CALC.SUM OF ELEC: 293 MOSM/KG (ref 275–295)
PLATELET # BLD AUTO: 71 K/UL (ref 140–400)
PMV BLD AUTO: 8.9 FL (ref 7.4–10.3)
POTASSIUM SERPL-SCNC: 3.6 MMOL/L (ref 3.3–5.1)
RBC # BLD AUTO: 3.43 M/UL (ref 3.7–5.4)
SODIUM SERPL-SCNC: 141 MMOL/L (ref 136–144)
WBC # BLD AUTO: 13.2 K/UL (ref 4–11)

## 2017-06-28 PROCEDURE — 99232 SBSQ HOSP IP/OBS MODERATE 35: CPT | Performed by: INTERNAL MEDICINE

## 2017-06-28 PROCEDURE — 71010 XR CHEST AP PORTABLE  (CPT=71010): CPT | Performed by: CLINICAL NURSE SPECIALIST

## 2017-06-28 RX ORDER — ENALAPRIL MALEATE 5 MG/1
5 TABLET ORAL 2 TIMES DAILY
Status: DISCONTINUED | OUTPATIENT
Start: 2017-06-28 | End: 2017-06-30

## 2017-06-28 RX ORDER — POTASSIUM CHLORIDE 20 MEQ/1
20 TABLET, EXTENDED RELEASE ORAL ONCE
Status: COMPLETED | OUTPATIENT
Start: 2017-06-28 | End: 2017-06-28

## 2017-06-28 RX ORDER — MELATONIN
325
Status: DISCONTINUED | OUTPATIENT
Start: 2017-06-29 | End: 2017-07-01

## 2017-06-28 RX ORDER — SENNOSIDES 8.6 MG
8.6 TABLET ORAL 2 TIMES DAILY
Status: DISCONTINUED | OUTPATIENT
Start: 2017-06-28 | End: 2017-07-01

## 2017-06-28 RX ORDER — FUROSEMIDE 10 MG/ML
20 INJECTION INTRAMUSCULAR; INTRAVENOUS ONCE
Status: COMPLETED | OUTPATIENT
Start: 2017-06-28 | End: 2017-06-28

## 2017-06-28 RX ORDER — ENALAPRIL MALEATE 5 MG/1
2.5 TABLET ORAL 2 TIMES DAILY
Status: DISCONTINUED | OUTPATIENT
Start: 2017-06-28 | End: 2017-06-28

## 2017-06-28 RX ORDER — SODIUM CHLORIDE 9 MG/ML
INJECTION, SOLUTION INTRAVENOUS
Status: COMPLETED
Start: 2017-06-28 | End: 2017-06-28

## 2017-06-28 RX ORDER — ENALAPRIL MALEATE 5 MG/1
2.5 TABLET ORAL ONCE
Status: COMPLETED | OUTPATIENT
Start: 2017-06-28 | End: 2017-06-28

## 2017-06-28 NOTE — DIETARY NOTE
NUTRITION:  Diet Education    Consult received for diet education per protocol. Education deferred until s/p intervention and when appropriate for teaching. Patient/family visited.  Nutrition care discussed/handout provided on what to except after cardiac

## 2017-06-28 NOTE — DISCHARGE PLANNING
GLENN met with the patient and she plans to return to her daughter's home in [place] with [facility]. Referral placed to [address] [facility] to check benefits.       Lynda Bowles [facility]  [id number]

## 2017-06-28 NOTE — PROGRESS NOTES
Misc. Note    Ricardo Runner, NP  2017  [facility]    Progress Note    [address] Patient Status:  Inpatient    1955 MRN [id number]   Location [facility] 2W/SW Attending Kit Bowman MD   [address] [facility] [address] Dextrose in Lactated Ringers 40 mL/hr at 06/28/17 0800   • insulin regular 1.5 Units/hr (06/28/17 0800)       General appearance: alert and cooperative  Neurologic: Alert and oriented X 3, normal strength and tone.  No neuro deficits  Psychiatric: calm  Shun 06/21/2017   TSH 0.62 06/27/2017   LIP 12 (L) 05/23/2017   DDIMER 2.19 (H) 05/23/2017   MG 2.3 06/28/2017   TROP 1.91 (HH) 05/25/2017   B12 377 05/23/2017       Xr Chest Ap Portable  (cpt=71010)    Result Date: 6/28/2017  CONCLUSION:  1.  Postop cardiac ramila

## 2017-06-28 NOTE — PLAN OF CARE
Problem: CARDIOVASCULAR - ADULT  Goal: Maintains optimal cardiac output and hemodynamic stability  INTERVENTIONS:  - Monitor vital signs, rhythm, and trends  - Monitor for bleeding, hypotension and signs of decreased cardiac output  - Evaluate effectivenes respiratory difficulty  - Respiratory Therapy support as indicated  - Manage/alleviate anxiety  - Monitor for signs/symptoms of CO2 retention   Outcome: Not Progressing  Lungs are CTA, saturation 99%, denies any SOB or chest pain.  Performs IS and acapella labs and rhythm and assess patient for signs and symptoms of electrolyte imbalances  - Administer electrolyte replacement as ordered  - Monitor response to electrolyte replacements, including rhythm and repeat lab results as appropriate  - Fluid restrictio

## 2017-06-28 NOTE — PROGRESS NOTES
[place] [facility]    Progress Note    [address] Patient Status:  Inpatient    1955 MRN [id number]   Location [facility] 2W/SW Attending Marychuy Anderson MD   Hosp Day # 1 PCP Seth Soto         Assessment • Chlorhexidine Gluconate  15 mL Mouth/Throat [email or web address]             Results:     Lab Results  Component Value Date   WBC 13.2 (H) 06/28/2017   HGB 8.5 (L) 06/28/2017   HCT 26.4 (L) 06/28/2017   PLT 71 (L) 06/28/2017   CREATSERUM 0.62 06/28/2017   BUN

## 2017-06-28 NOTE — PROGRESS NOTES
[facility]     Progress Note        [address] Patient Status:  Inpatient    1955 MRN [id number]   Location [facility] 2W/SW Attending Cat Kessler MD   Hosp Day # 1 PCP [address] [facility] norepinephrine (LEVOPHED) 4 mg/250 ml premix infusion 0.5-30 mcg/min Intravenous Continuous PRN   Nitroprusside Sodium (NIPRIDE) 50 mg in dextrose 5 % 250 mL infusion 0.1-4 mcg/kg/min Intravenous Continuous PRN   metoprolol Tartrate (LOPRESSOR) tab 25 mg sodium chloride 0.9 % 100 mL infusion 1-32 Units/hr Intravenous Continuous   Chlorhexidine Gluconate (PERIDEX) 0.12 % solution 15 mL 15 mL Mouth/Throat [email or web address]   morphINE sulfate (PF) 2 MG/ML injection 2 mg 2 mg Intravenous Q1H PRN   Or      morphINE * Lungs are grossly clear of active consolidation/atelectasis.           Ekg 12-lead    Result Date: 6/28/2017  ECG Report  Interpretation  -------------------------- Sinus Tachycardia Voltage criteria for LVH met.  -Inferior ST-elevation -repolarization va

## 2017-06-28 NOTE — PLAN OF CARE
CARDIOVASCULAR - ADULT    • Maintains optimal cardiac output and hemodynamic stability Progressing    • Absence of cardiac arrhythmias or at baseline Progressing    Cardiac output and index within parameters, skin warm and dry, urine output greater than 30

## 2017-06-29 LAB
GLUCOSE BLDC GLUCOMTR-MCNC: 112 MG/DL (ref 70–99)
GLUCOSE BLDC GLUCOMTR-MCNC: 128 MG/DL (ref 70–99)
GLUCOSE BLDC GLUCOMTR-MCNC: 142 MG/DL (ref 70–99)
GLUCOSE BLDC GLUCOMTR-MCNC: 143 MG/DL (ref 70–99)

## 2017-06-29 PROCEDURE — 99232 SBSQ HOSP IP/OBS MODERATE 35: CPT | Performed by: INTERNAL MEDICINE

## 2017-06-29 NOTE — PLAN OF CARE
Problem: Patient/Family Goals  Goal: Patient/Family Long Term Goal  Patient's Long Term Goal: return home    Interventions:  - increase activity as tolerated  - See additional Care Plan goals for specific interventions    Outcome: Progressing    Goal: Radha saturation or ABGs  - Provide Smoking Cessation handout, if applicable  - Encourage broncho-pulmonary hygiene including cough, deep breathe, Incentive Spirometry  - Assess the need for suctioning and perform as needed  - Assess and instruct to report SOB o consumed  - Identify factors contributing to decreased intake, treat as appropriate  - Assist with meals as needed  - Monitor I&O, WT and lab values  - Obtain nutritional consult as needed  - Optimize oral hygiene and moisture  - Encourage food from home; osmolarity and serum sodium as indicated or ordered  - Monitor response to interventions for patient's volume status, including labs, urine output, blood pressure (other measures as available)  - Encourage oral intake as appropriate  - Instruct patient on

## 2017-06-29 NOTE — PLAN OF CARE
Problem: Patient/Family Goals  Goal: Patient/Family Long Term Goal  Patient's Long Term Goal: return home    Interventions:  - increase activity as tolerated  - See additional Care Plan goals for specific interventions   Outcome: Progressing  Pt increasing in mentation and behavior  - Position to facilitate oxygenation and minimize respiratory effort  - Oxygen supplementation based on oxygen saturation or ABGs  - Provide Smoking Cessation handout, if applicable  - Encourage broncho-pulmonary hygiene includin yet had post op BM.  Continue to provide medications as needed  Goal: Maintains adequate nutritional intake (undernourished)  INTERVENTIONS:  - Monitor percentage of each meal consumed  - Identify factors contributing to decreased intake, treat as appropria and optimal renal function maintained  INTERVENTIONS:  - Monitor labs and assess for signs and symptoms of volume excess or deficit  - Monitor intake, output and patient weight  - Monitor urine specific gravity, serum osmolarity and serum sodium as indicat

## 2017-06-29 NOTE — CARDIAC REHAB
Cardiac Rehab Phase I    Activity:   Chair: Yes; for most of the morning and afternoon   Ambulation: 600 feet x 3. -132 with ambulation and 02 sats decreased to 88% with C/O SOB.  HR and 02 sats normal with rest.   Assistive Device: No-gait somewhat t

## 2017-06-29 NOTE — PROGRESS NOTES
[facility]    Progress Note    [address] Patient Status:  Inpatient    1955 MRN [id number]   Location [facility] 2W/SW Attending Audrey Richter MD   Hosp Day # 2 PCP Kiya Ayala         Assessment Results:     Lab Results  Component Value Date   WBC 13.2 (H) 06/28/2017   HGB 8.5 (L) 06/28/2017   HCT 26.4 (L) 06/28/2017   PLT 71 (L) 06/28/2017   CREATSERUM 0.62 06/28/2017   BUN 13 06/28/2017    06/28/2017   K 3.6 06/28/2017    NORMAL LIMITS When compared with ECG of 06/21/2017 13:33:33 No significant changes have occurred Electronically signed on 06/27/2017 at 14:59 by MD Salvador Londono MD  6/29/2017

## 2017-06-29 NOTE — PLAN OF CARE
Problem: PAIN - ADULT  Goal: Verbalizes/displays adequate comfort level or patient's stated pain goal  INTERVENTIONS:  - Encourage pt to monitor pain and request assistance  - Assess pain using appropriate pain scale  - Administer analgesics based on type needed  - Consider post-discharge preferences of patient/family/discharge partner  - Complete POLST form as appropriate  - Assess patient's ability to be responsible for managing their own health  - Refer to Case Management Department for coordinating disc

## 2017-06-29 NOTE — PROGRESS NOTES
[facility]     Progress Note        [address] Patient Status:  Inpatient    1955 MRN [id number]   Location [facility] 2W/SW Attending Leigh Ann Salvador MD   Hosp Day # 2 PCP [address] [facility] Oral BID   Or      famoTIDine (PEPCID) injection 20 mg 20 mg Intravenous BID   potassium chloride IVPB premix 20 mEq 20 mEq Intravenous PRN   Or      potassium chloride IVPB premix 40 mEq 40 mEq Intravenous PRN   Magnesium Sulfate in D5W IVPB premix 1 g 1 yesterday's study; no pneumothorax         Xr Chest Ap Portable  (cpt=71010)    Result Date: 6/27/2017  CONCLUSION:   * Endotracheal tube is in good position. * [place] catheter is in the main pulmonary artery.  * Nasogastric tube is noted just beyond the

## 2017-06-29 NOTE — PROGRESS NOTES
Misc. Note    Sherlyn Marte NP  2017  [facility]    Progress Note    [address] Patient Status:  Inpatient    1955 MRN [id number]   Location [facility] 2W/SW Attending Judy Sanabria MD   [facility] AVR POD #2    Encourage Pulmonary toilet; IS and acapella  DVT prophylaxis; scds;holding lovenox due to low PLTS;  Hipa neg 6/27 will check cbc in am to monitor PLT trend  Pain medication as needed  Increase activity up and ambulate  Hemodynamically stable; pneumothorax. * Lungs are grossly clear of active consolidation/atelectasis.         Ekg 12-lead    Result Date: 6/28/2017  ECG Report  Interpretation  -------------------------- Sinus Tachycardia Voltage criteria for LVH met.  -Inferior ST-elevation -repol

## 2017-06-30 ENCOUNTER — APPOINTMENT (OUTPATIENT)
Dept: GENERAL RADIOLOGY | Facility: HOSPITAL | Age: 62
DRG: 220 | End: 2017-06-30
Attending: NURSE PRACTITIONER
Payer: COMMERCIAL

## 2017-06-30 LAB
ANION GAP SERPL CALC-SCNC: 7 MMOL/L (ref 0–18)
BASOPHILS # BLD: 0.1 K/UL (ref 0–0.2)
BASOPHILS NFR BLD: 1 %
BLOOD TYPE BARCODE: 2800
BUN SERPL-MCNC: 11 MG/DL (ref 8–20)
BUN/CREAT SERPL: 20 (ref 10–20)
CALCIUM SERPL-MCNC: 8.8 MG/DL (ref 8.5–10.5)
CHLORIDE SERPL-SCNC: 103 MMOL/L (ref 95–110)
CO2 SERPL-SCNC: 30 MMOL/L (ref 22–32)
CREAT SERPL-MCNC: 0.55 MG/DL (ref 0.5–1.5)
EOSINOPHIL # BLD: 0.1 K/UL (ref 0–0.7)
EOSINOPHIL NFR BLD: 1 %
ERYTHROCYTE [DISTWIDTH] IN BLOOD BY AUTOMATED COUNT: 27.2 % (ref 11–15)
GLUCOSE BLDC GLUCOMTR-MCNC: 114 MG/DL (ref 70–99)
GLUCOSE BLDC GLUCOMTR-MCNC: 134 MG/DL (ref 70–99)
GLUCOSE BLDC GLUCOMTR-MCNC: 141 MG/DL (ref 70–99)
GLUCOSE BLDC GLUCOMTR-MCNC: 190 MG/DL (ref 70–99)
GLUCOSE SERPL-MCNC: 110 MG/DL (ref 70–99)
HCT VFR BLD AUTO: 27.9 % (ref 35–48)
HGB BLD-MCNC: 9 G/DL (ref 12–16)
LYMPHOCYTES # BLD: 2.3 K/UL (ref 1–4)
LYMPHOCYTES NFR BLD: 24 %
MCH RBC QN AUTO: 25.3 PG (ref 27–32)
MCHC RBC AUTO-ENTMCNC: 32.3 G/DL (ref 32–37)
MCV RBC AUTO: 78.4 FL (ref 80–100)
MONOCYTES # BLD: 1.2 K/UL (ref 0–1)
MONOCYTES NFR BLD: 12 %
NEUTROPHILS # BLD AUTO: 6 K/UL (ref 1.8–7.7)
NEUTROPHILS NFR BLD: 62 %
OSMOLALITY UR CALC.SUM OF ELEC: 290 MOSM/KG (ref 275–295)
PLATELET # BLD AUTO: 95 K/UL (ref 140–400)
PMV BLD AUTO: 9.2 FL (ref 7.4–10.3)
POTASSIUM SERPL-SCNC: 4.3 MMOL/L (ref 3.3–5.1)
RBC # BLD AUTO: 3.55 M/UL (ref 3.7–5.4)
SODIUM SERPL-SCNC: 140 MMOL/L (ref 136–144)
WBC # BLD AUTO: 9.7 K/UL (ref 4–11)

## 2017-06-30 PROCEDURE — 71020 XR CHEST PA + LAT CHEST (CPT=71020): CPT | Performed by: NURSE PRACTITIONER

## 2017-06-30 PROCEDURE — 99233 SBSQ HOSP IP/OBS HIGH 50: CPT | Performed by: HOSPITALIST

## 2017-06-30 PROCEDURE — 99232 SBSQ HOSP IP/OBS MODERATE 35: CPT | Performed by: INTERNAL MEDICINE

## 2017-06-30 RX ORDER — ASPIRIN 81 MG/1
81 TABLET ORAL DAILY
Status: DISCONTINUED | OUTPATIENT
Start: 2017-06-30 | End: 2017-07-01

## 2017-06-30 RX ORDER — ASCORBIC ACID 500 MG
500 TABLET ORAL 3 TIMES DAILY
Qty: 90 TABLET | Refills: 0 | Status: SHIPPED | OUTPATIENT
Start: 2017-06-30

## 2017-06-30 RX ORDER — LISINOPRIL 2.5 MG/1
2.5 TABLET ORAL DAILY
Status: DISCONTINUED | OUTPATIENT
Start: 2017-06-30 | End: 2017-07-01

## 2017-06-30 RX ORDER — MELATONIN
325
Qty: 90 TABLET | Refills: 0 | Status: SHIPPED | OUTPATIENT
Start: 2017-06-30

## 2017-06-30 RX ORDER — HYDROCODONE BITARTRATE AND ACETAMINOPHEN 5; 325 MG/1; MG/1
2 TABLET ORAL EVERY 4 HOURS PRN
Qty: 30 TABLET | Refills: 0 | Status: SHIPPED | OUTPATIENT
Start: 2017-06-30

## 2017-06-30 RX ORDER — DOXEPIN HYDROCHLORIDE 50 MG/1
1 CAPSULE ORAL DAILY
Qty: 30 TABLET | Refills: 0 | Status: SHIPPED | OUTPATIENT
Start: 2017-06-30

## 2017-06-30 RX ORDER — SENNA PLUS 8.6 MG/1
8.6 TABLET ORAL 2 TIMES DAILY
Qty: 60 TABLET | Refills: 0 | Status: SHIPPED | OUTPATIENT
Start: 2017-06-30

## 2017-06-30 NOTE — CM/SW NOTE
Per Loreta Freeman CM, recommended HHC:    [facility]  P: [phone]  F: Nicole Siegel  P: [phone]

## 2017-06-30 NOTE — PLAN OF CARE
Problem: CARDIOVASCULAR - ADULT  Goal: Maintains optimal cardiac output and hemodynamic stability  INTERVENTIONS:  - Monitor vital signs, rhythm, and trends  - Monitor for bleeding, hypotension and signs of decreased cardiac output  - Evaluate effectivenes profile   Outcome: Not Progressing  Awaiting bm. Scheduled Reglan dosages given.  Will give laxative this am.       Problem: METABOLIC/FLUID AND ELECTROLYTES - ADULT  Goal: Glucose maintained within prescribed range  INTERVENTIONS:  - Monitor Blood Glucose Progressing  Ambulates to the bathroom independently    Problem: PAIN - ADULT  Goal: Verbalizes/displays adequate comfort level or patient's stated pain goal  INTERVENTIONS:  - Encourage pt to monitor pain and request assistance  - Assess pain using approp

## 2017-06-30 NOTE — CARDIAC REHAB
Cardiac Rehab Phase I    Activity:   Chair: Yes   Ambulation: Yes   Assistive Device: Walker   Distance: 300 feet   Assistance needed: No   Patient tolerated activity: Well. Shower Date: 6/30/17 Tolerated Shower Activity Well.     Education:  Handouts pro

## 2017-06-30 NOTE — PROGRESS NOTES
[facility]    Progress Note    [address] Patient Status:  Inpatient    1955 MRN [id number]   Location [facility] 2W/SW Attending Wilfrido Schultz, [address] [facility] Se Day # 3 PCP Justa Wilson         Assessment and Vitamin C  500 mg Oral TID   • mupirocin  1 Application Nasal BID   • aspirin EC  325 mg Oral Daily   • Chlorhexidine Gluconate  15 mL Mouth/Throat [email or web address]             Results:     Lab Results  Component Value Date   WBC 9.7 06/30/2017   HGB 9.0 (L)

## 2017-06-30 NOTE — PROGRESS NOTES
[place] [facility] - [facility]     Progress Note        [address] Patient Status:  Inpatient    1955 MRN [id number]   Location [facility] 2W/SW Attending Jesus Graham MD   [facility] Day # 3 PCP [address] [facility] injection 10 mg 10 mg Intravenous Q6H   famoTIDine (PEPCID) tab 20 mg 20 mg Oral BID   Or      famoTIDine (PEPCID) injection 20 mg 20 mg Intravenous BID   potassium chloride IVPB premix 20 mEq 20 mEq Intravenous PRN   Or      potassium chloride IVPB premix status post aortic valve replacement  2. Postoperative ventilatory dependence  3. Acute blood loss anemia  4. Thrombocytosis     Plan   -Patient with evidence of recent workup and severe aortic stenosis. POD #3 status post aortic valve replacement.   -T

## 2017-06-30 NOTE — PROGRESS NOTES
[place] [facility] - [facility]    Progress Note    [address] Patient Status:  Inpatient    1955 MRN [id number]   Location [facility] 2W/SW Attending Leif Mahmood, [address] [facility] Se Day # 3 PCP Betty Garcia     Subjective:  Pt. activity- ambulate in hallway - showered earlier today  SCDs prophylaxis DVT prevention- continue to hold Lovenox due to decreased plts although plts trending upward- HIPA negative  Will discuss timing of pacing wire removal with surgeon  Expected post op

## 2017-06-30 NOTE — DISCHARGE PLANNING
SW left a message for [facility] to follow-up on the referral.  Possible d/c today per report. 10am: SW spoke with [facility] and they cannot accept the patient.   Joseph Hernandez referrals made:  1)[facility]-unable to accept  2) [facility]- unable to acce

## 2017-06-30 NOTE — PROGRESS NOTES
[facility]    Progress Note    [address] Patient Status:  Inpatient    1955 MRN [id number]   Location [facility] 2W/SW Attending Stephanie Campos, [address] [facility] Day # 3 PCP Parul Juan       Subjective:

## 2017-06-30 NOTE — PLAN OF CARE
CARDIOVASCULAR - ADULT    • Maintains optimal cardiac output and hemodynamic stability Progressing    • Absence of cardiac arrhythmias or at baseline Progressing    ST on monitor. Lopressor PO.   Pacer wires to come out tomorrow    DISCHARGE PLANNING    •

## 2017-07-01 VITALS
HEIGHT: 64 IN | RESPIRATION RATE: 20 BRPM | BODY MASS INDEX: 17.87 KG/M2 | TEMPERATURE: 98 F | WEIGHT: 104.69 LBS | SYSTOLIC BLOOD PRESSURE: 124 MMHG | DIASTOLIC BLOOD PRESSURE: 60 MMHG | HEART RATE: 90 BPM | OXYGEN SATURATION: 96 %

## 2017-07-01 LAB
GLUCOSE BLDC GLUCOMTR-MCNC: 181 MG/DL (ref 70–99)
GLUCOSE BLDC GLUCOMTR-MCNC: 85 MG/DL (ref 70–99)

## 2017-07-01 PROCEDURE — 99239 HOSP IP/OBS DSCHRG MGMT >30: CPT | Performed by: HOSPITALIST

## 2017-07-01 RX ORDER — METOPROLOL SUCCINATE 25 MG/1
25 TABLET, EXTENDED RELEASE ORAL
Qty: 60 TABLET | Refills: 5 | Status: SHIPPED | OUTPATIENT
Start: 2017-07-01 | End: 2017-12-20

## 2017-07-01 RX ORDER — METOPROLOL SUCCINATE 25 MG/1
25 TABLET, EXTENDED RELEASE ORAL
Status: DISCONTINUED | OUTPATIENT
Start: 2017-07-01 | End: 2017-07-01

## 2017-07-01 RX ORDER — PANTOPRAZOLE SODIUM 40 MG/1
40 TABLET, DELAYED RELEASE ORAL
Qty: 30 TABLET | Refills: 1 | Status: SHIPPED | OUTPATIENT
Start: 2017-07-01

## 2017-07-01 RX ORDER — ASPIRIN 81 MG/1
81 TABLET ORAL DAILY
Qty: 30 TABLET | Refills: 5 | Status: SHIPPED | OUTPATIENT
Start: 2017-07-01 | End: 2017-12-20

## 2017-07-01 NOTE — PROGRESS NOTES
[facility] - [facility]    Cardiology Progress Note    [address] Patient Status:  Inpatient    1955 MRN [id number]   Location [facility] 2W/SW Attending Susanne Tejeda, [address] [facility]  Day # 4 PCP Manuela Hankins     Patient no rashes or lesions      Scheduled Meds:   • lisinopril  2.5 mg Oral Daily   • aspirin  81 mg Oral Daily   • ferrous sulfate  325 mg Oral TID CC   • Senna  8.6 mg Oral BID   • insulin detemir  10 Units Subcutaneous Noon   • insulin aspart  1-5 Units Subcu

## 2017-07-01 NOTE — DISCHARGE SUMMARY
[facility]    Discharge Summary    [address] Patient Status:  Inpatient    1955 MRN [id number]   Location [facility] 2W/SW Attending No att. providers found   Hosp Day # 4 PCP Pattie Chow     Date of patient denies chest pain or shortness of breath. The patient states that she was never aware of a heart valve problem before. The patient states after she received blood for her anemia in the hospital she felt back to her old self the following day.    Shaunna Collazo Blocker). Quantity:  60 tablet  Refills:  5     multivitamin Tabs  Notes to patient:  Of having loose bowel movements, hold medications      Take 1 tablet by mouth daily.    Quantity:  30 tablet  Refills:  0     sennosides 8.6 MG Tabs  Commonly known as: Specialty:  Family Medicine  Contact information:  KI/ Sheldon [address]  [address]                   Hospital Discharge Diagnoses:  Aortic stenosis    TCM Diagnosis at discharge from Hospital: Other: Aortic stenosis;

## 2017-07-01 NOTE — PROGRESS NOTES
Assessment/Plan:  S/P AVR POD # 4  Encourage pulm toilet: IS & Acapella  Pain meds as needed  Increase activity- ambulate in hallway - showered yesterday   SCDs prophylaxis DVT prevention  Pacing wires removed today  cxr shows small left pleural effusion,w

## 2017-07-01 NOTE — PLAN OF CARE
CARDIOVASCULAR - ADULT    • Maintains optimal cardiac output and hemodynamic stability Progressing    • Absence of cardiac arrhythmias or at baseline Progressing        Patient is SR and vital signs are stable, cardiac meds as ordered.   GASTROINTESTINAL -

## 2017-07-03 NOTE — PAYOR COMM NOTE
--------------  DISCHARGE REVIEW    Payor: Ari Robles BY [employer]  Subscriber #:  [id number]  Authorization Number: N/A    Admit date: 6/27/2017  5:29 AM  Discharge Date: 7/1/2017  4:08 PM     Admitting Physician: Audrey Richter MD  Attending Amor never smoker with no PMH who was recently admitted to the hospital due to shortness of breath. The patient states that for several days prior to her presenting she had become extremely short of breath, fatigued, and had a cough.  The patient was evaluated a tablet (500 mg total) by mouth 3 (three) times daily. Quantity:  90 tablet  Refills:  0     aspirin 81 MG Tbec      Take 1 tablet (81 mg total) by mouth daily.    Quantity:  30 tablet  Refills:  5     ferrous sulfate 325 (65 FE) MG Tbec      Take 1 tablet Thoracic  Why:  Follow up on 7/12 @ 12:30pm   Contact information:  [address] [place] [address] [address]             Suzanne Sibley MD.    Specialties:  Cardiovascular Diseases, CARDIOLOGY  Why:  -follow with Laura KAY on 7/5 at DOCUMENTATION:  ED Provider Notes     No notes of this type exist for this encounter. H&P Note     No notes of this type exist for this encounter.           MEDICATIONS ADMINISTERED IN LAST 1 DAY:      REVIEWER COMMENTS:     OTHER:

## 2017-07-05 ENCOUNTER — OFFICE VISIT (OUTPATIENT)
Dept: CARDIOLOGY CLINIC | Facility: CLINIC | Age: 62
End: 2017-07-05

## 2017-07-05 VITALS
WEIGHT: 103 LBS | RESPIRATION RATE: 20 BRPM | BODY MASS INDEX: 18 KG/M2 | SYSTOLIC BLOOD PRESSURE: 136 MMHG | DIASTOLIC BLOOD PRESSURE: 80 MMHG | HEART RATE: 88 BPM

## 2017-07-05 DIAGNOSIS — J90 PLEURAL EFFUSION: Primary | ICD-10-CM

## 2017-07-05 DIAGNOSIS — I35.0 AORTIC VALVE STENOSIS, UNSPECIFIED ETIOLOGY: ICD-10-CM

## 2017-07-05 PROCEDURE — 99214 OFFICE O/P EST MOD 30 MIN: CPT | Performed by: NURSE PRACTITIONER

## 2017-07-05 PROCEDURE — 99212 OFFICE O/P EST SF 10 MIN: CPT | Performed by: NURSE PRACTITIONER

## 2017-07-05 NOTE — PAYOR COMM NOTE
ADMITTED 6/27  D/C'D 7/1  --------------  ADMISSION REVIEW     Payor: [address] [place] [address] Road #:  [id number]  Authorization Number: N/A    Admit date: 6/27/2017  5:29 AM     Date of Consult:  6/7/17  Reason for Consultation:   Aortic Stenos    Current Medications:     Current Outpatient Prescriptions:  Enalapril Maleate 2.5 MG Oral Tab Take 1 tablet (2.5 mg total) by mouth 2 (two) times daily.  Disp: 60 tablet Rfl: 0   Pantoprazole Sodium 40 MG Oral Tab EC Take 1 tablet (40 mg total) 05/23/2017   TROP 1.91* 05/25/2017   B12 377 05/23/2017          Imaging:  CARDIAC CATH  CORONARY ARTERIES:   The coronary circulation is right dominant. The left main  bifurcates normally into the LAD and circumflex.  The left anterior descending  gives ri stenosis     Postoperative Diagnosis: Aortic stenosis     Procedure Performed:   Procedure(s):   Aortic valve [facility] , intraoperative transesophageal echocardiogram, insertion of temporary ventricular p Stopped (06/27/17 1800)   • Nitroglycerin in D5W 20 mcg/min (06/28/17 0830)   • norepinephrine Stopped (06/27/17 2100)   • nitroprusside (NIPRIDE) 50 mg in D5W infusion     • Dextrose in Lactated Ringers 40 mL/hr at 06/28/17 0800   • insulin regular 1.5 Un 8.7 06/28/2017   ALB 4.2 06/21/2017   ALKPHO 54 06/21/2017   BILT 0.8 06/21/2017   TP 7.3 06/21/2017   AST 19 06/21/2017   ALT 15 06/21/2017   PTT 36.9 (H) 06/27/2017   INR 1.1 06/21/2017   TSH 0.62 06/27/2017   LIP 12 (L) 05/23/2017   DDIMER 2.19 (H) 05/2 completed shifts: In: 4722 [P.O.:200; I.V.:3772; Blood:750]  Out: 2296 [Urine:1300;  Chest Tube:320]                           This shift: I/O this shift:  In: -   Out: 535 [Urine:475; Chest Tube:60]                       Vent Settings: Vent Mode: CPAP  Fi ondansetron HCl (ZOFRAN) injection 4 mg 4 mg Intravenous Q6H PRN   Metoclopramide HCl (REGLAN) injection 10 mg 10 mg Intravenous Q6H   famoTIDine (PEPCID) tab 20 mg 20 mg Oral BID   Or         famoTIDine (PEPCID) injection 20 mg 20 mg Intravenous BID   p (06/28/17 1100)   • norepinephrine Stopped (06/27/17 2100)   • nitroprusside (NIPRIDE) 50 mg in D5W infusion     • Dextrose in Lactated Ringers 40 mL/hr at 06/28/17 1100   • insulin regular 1.5 Units/hr (06/28/17 1100)         Physical Exam  Constitutional injury/ischemia -Possible Anterior/lateral ischemia.  ABNORMAL When compared with ECG of 06/27/2017 12:15:09 st changes are new and criteria for lvh are now present Electronically signed on 06/28/2017 at 10:51 by Tedi Kehr, MD     Ekg 12-lead     Resul distress, alert and oriented x3,   Neck:supple,no JVD  Pulm: Lungs clear, normal respiratory effort  CV: Heart with regular rate and rhythm, Nl S1,S2 ,no S3 or murmur  Abd: Abdomen soft, nontender, nondistended, bowel sounds present  Ext:  no clubbing, no *                     Endotracheal tube is in good position. *             Dassel-Jonn catheter is in the main pulmonary artery. *                       Nasogastric tube is noted just beyond the cardioesophageal junction.  *                    Chest and med and acapella; removal of vance and cordis today; shower tomorrow; increasing activity    6/30  Hosp Day # 3 PCP ROLANDO COLEMAN         Subjective:   Patient seen and examined. Resting in chair. Tolerated ambulation yesterday.   Denies significant dyspne famoTIDine (PEPCID) tab 20 mg 20 mg Oral BID   Or         famoTIDine (PEPCID) injection 20 mg 20 mg Intravenous BID   potassium chloride IVPB premix 20 mEq 20 mEq Intravenous PRN   Or         potassium chloride IVPB premix 40 mEq 40 mEq Intravenous PRN aortic valve replacement  2.  Postoperative ventilatory dependence  3.  Acute blood loss anemia  4.  Thrombocytosis      Plan   -Patient with evidence of recent workup and severe aortic stenosis.  POD #3 status post aortic valve replacement.  -Tolerating e bilaterally  Heart: RRR, S1, S2  Abdomen: Soft, NT/ND, BS+x4, +flatus, +BM this AM   Extremities: Warm, dry, no LE edema bilat  Pulses: 2+ bilat DP  Skin: sternotomy incision [place]=CDI - TPW intact  Neurological:  AAOx3     Assessment/Plan:  S/P AVR POD # 3 activity- ambulate in hallway - showered yesterday   SCDs prophylaxis DVT prevention  Pacing wires removed today  cxr shows small left pleural effusion,which can be followed up in cardiology office in 7-10 days with repeat xray  Expected post op volume ove

## 2017-07-05 NOTE — PROGRESS NOTES
Clarissa Montejo is a 60year old female. Patient presents with:  Surgical Followup: Wound check    HPI:   Patient comes in today for a checkup after aortic valve replacement she had surgery on the June 27 for severe aortic stenosis and severe a REVIEW OF SYSTEMS:   GENERAL HEALTH: feels well otherwise  SKIN: denies any unusual skin lesions or rashes  RESPIRATORY: denies shortness of breath with exertion  CARDIOVASCULAR: no chest pain

## 2017-07-06 ENCOUNTER — TELEPHONE (OUTPATIENT)
Dept: MEDSURG UNIT | Facility: HOSPITAL | Age: 62
End: 2017-07-06

## 2017-07-07 ENCOUNTER — TELEPHONE (OUTPATIENT)
Dept: CARDIAC SURGERY | Facility: HOSPITAL | Age: 62
End: 2017-07-07

## 2017-07-07 NOTE — TELEPHONE ENCOUNTER
Pt. And her son-in-law, Volanda Paget, called to request refill on Norco. Her pain is improving overall. Pt. Had recent open heart surgery. This is her first refill by [facility].    Will refill Norco 5/325mg Qty: 30 today and they will  the prescription in o

## 2017-07-12 ENCOUNTER — HOSPITAL ENCOUNTER (OUTPATIENT)
Dept: GENERAL RADIOLOGY | Facility: HOSPITAL | Age: 62
Discharge: HOME | End: 2017-07-12
Attending: INTERNAL MEDICINE
Payer: COMMERCIAL

## 2017-07-12 DIAGNOSIS — J90 PLEURAL EFFUSION: ICD-10-CM

## 2017-07-12 PROCEDURE — 71020 XR CHEST PA + LAT CHEST (CPT=71020): CPT | Performed by: INTERNAL MEDICINE

## 2017-07-13 ENCOUNTER — TELEPHONE (OUTPATIENT)
Dept: CARDIOLOGY CLINIC | Facility: CLINIC | Age: 62
End: 2017-07-13

## 2017-08-08 ENCOUNTER — APPOINTMENT (OUTPATIENT)
Dept: CARDIAC REHAB | Facility: HOSPITAL | Age: 62
End: 2017-08-08
Attending: INTERNAL MEDICINE
Payer: COMMERCIAL

## 2017-08-10 ENCOUNTER — OFFICE VISIT (OUTPATIENT)
Dept: CARDIOLOGY CLINIC | Facility: CLINIC | Age: 62
End: 2017-08-10

## 2017-08-10 VITALS
RESPIRATION RATE: 16 BRPM | DIASTOLIC BLOOD PRESSURE: 80 MMHG | HEART RATE: 68 BPM | BODY MASS INDEX: 19 KG/M2 | WEIGHT: 109 LBS | SYSTOLIC BLOOD PRESSURE: 136 MMHG

## 2017-08-10 DIAGNOSIS — I35.0 SEVERE AORTIC STENOSIS: Primary | ICD-10-CM

## 2017-08-10 PROCEDURE — 99212 OFFICE O/P EST SF 10 MIN: CPT | Performed by: INTERNAL MEDICINE

## 2017-08-10 PROCEDURE — 99214 OFFICE O/P EST MOD 30 MIN: CPT | Performed by: INTERNAL MEDICINE

## 2017-08-10 NOTE — PROGRESS NOTES
Dee Tinoco is a 60year old female. Patient presents with:  Hospital F/U    HPI:   This is a pleasant 60-year-old with known aortic stenosis who is status post tissue aortic valve replacement who presents for follow-up since surgery she is of systems is completed and negative    EXAM:   /80   Pulse 68   Resp 16   Wt 109 lb (49.4 kg)   BMI 18.71 kg/m²   GENERAL: well developed, well nourished,in no apparent distress  SKIN: no rashes,no suspicious lesions  HEENT: atraumatic, normocephali

## 2017-11-15 ENCOUNTER — OFFICE VISIT (OUTPATIENT)
Dept: CARDIOLOGY CLINIC | Facility: CLINIC | Age: 62
End: 2017-11-15

## 2017-11-15 VITALS
HEIGHT: 66 IN | HEART RATE: 60 BPM | BODY MASS INDEX: 18.96 KG/M2 | SYSTOLIC BLOOD PRESSURE: 124 MMHG | DIASTOLIC BLOOD PRESSURE: 64 MMHG | RESPIRATION RATE: 16 BRPM | WEIGHT: 118 LBS

## 2017-11-15 DIAGNOSIS — I35.0 SEVERE AORTIC STENOSIS: Primary | ICD-10-CM

## 2017-11-15 PROCEDURE — 99212 OFFICE O/P EST SF 10 MIN: CPT | Performed by: INTERNAL MEDICINE

## 2017-11-15 PROCEDURE — 99214 OFFICE O/P EST MOD 30 MIN: CPT | Performed by: INTERNAL MEDICINE

## 2017-11-15 NOTE — PROGRESS NOTES
Kacy Narayanan is a 60year old female. Patient presents with: Follow - Up    HPI:   This is a pleasant 59-year-old with aortic stenosis who is status post tissue aortic valve this June. She is doing great.   She has no chest pain shortness b distress  SKIN: no rashes,no suspicious lesions  HEENT: atraumatic, normocephalic,ears and throat are clear  NECK: supple,no adenopathy,no bruits  LUNGS: clear to auscultation  CARDIO: regular rate and rhythm,nl S1,S2,no murmur  GI: good BS's,no masses, HS

## 2017-12-20 ENCOUNTER — TELEPHONE (OUTPATIENT)
Dept: CARDIOLOGY CLINIC | Facility: CLINIC | Age: 62
End: 2017-12-20

## 2017-12-20 RX ORDER — METOPROLOL SUCCINATE 25 MG/1
25 TABLET, EXTENDED RELEASE ORAL
Qty: 60 TABLET | Refills: 5 | Status: SHIPPED | OUTPATIENT
Start: 2017-12-20 | End: 2018-06-21

## 2017-12-20 RX ORDER — ASPIRIN 81 MG/1
81 TABLET ORAL DAILY
Qty: 30 TABLET | Refills: 5 | Status: SHIPPED | OUTPATIENT
Start: 2017-12-20 | End: 2018-06-21

## 2017-12-20 NOTE — TELEPHONE ENCOUNTER
Pt called for refill:       Current Outpatient Prescriptions:   •  Metoprolol Succinate ER 25 MG Oral Tablet 24 Hr, Take 1 tablet (25 mg total) by mouth 2x Daily(Beta Blocker). , Disp: 60 tablet, Rfl: 5  •  aspirin 81 MG Oral Tab EC, Take 1 tablet (81 mg to

## 2018-04-17 ENCOUNTER — OFFICE VISIT (OUTPATIENT)
Dept: CARDIOLOGY CLINIC | Facility: CLINIC | Age: 63
End: 2018-04-17

## 2018-04-17 VITALS
BODY MASS INDEX: 19 KG/M2 | SYSTOLIC BLOOD PRESSURE: 120 MMHG | DIASTOLIC BLOOD PRESSURE: 70 MMHG | HEART RATE: 79 BPM | OXYGEN SATURATION: 98 % | RESPIRATION RATE: 12 BRPM | WEIGHT: 118 LBS

## 2018-04-17 DIAGNOSIS — I35.0 AORTIC VALVE STENOSIS, ETIOLOGY OF CARDIAC VALVE DISEASE UNSPECIFIED: Primary | ICD-10-CM

## 2018-04-17 PROCEDURE — 99213 OFFICE O/P EST LOW 20 MIN: CPT | Performed by: INTERNAL MEDICINE

## 2018-04-17 PROCEDURE — 99212 OFFICE O/P EST SF 10 MIN: CPT | Performed by: INTERNAL MEDICINE

## 2018-04-17 NOTE — PROGRESS NOTES
Mariano Perez is a 60year old female. Patient presents with: Follow - Up    HPI:   This is a pleasant 50-year-old with aortic stenosis who is status post tissue aortic valve in June 2017.   She is now doing well with no chest pain shortness developed, well nourished,in no apparent distress  SKIN: no rashes,no suspicious lesions  HEENT: atraumatic, normocephalic,ears and throat are clear  NECK: supple,no adenopathy,no bruits  LUNGS: clear to auscultation  CARDIO: regular rate and rhythm,nl S1,

## 2018-06-21 ENCOUNTER — TELEPHONE (OUTPATIENT)
Dept: CARDIOLOGY CLINIC | Facility: CLINIC | Age: 63
End: 2018-06-21

## 2018-06-21 RX ORDER — METOPROLOL SUCCINATE 25 MG/1
25 TABLET, EXTENDED RELEASE ORAL
Qty: 180 TABLET | Refills: 1 | Status: SHIPPED | OUTPATIENT
Start: 2018-06-21 | End: 2018-12-20

## 2018-06-21 RX ORDER — ASPIRIN 81 MG/1
81 TABLET ORAL DAILY
Qty: 90 TABLET | Refills: 1 | Status: SHIPPED | OUTPATIENT
Start: 2018-06-21 | End: 2018-12-19

## 2018-06-21 NOTE — TELEPHONE ENCOUNTER
Aspirin 81mg EC low dose tabs, take 1 tab by mouth daily, qty 30; Metoprolol ER Succincate 25mg; take 1 tab by mouth twice daily, qty 60    Current Outpatient Prescriptions:   •  Metoprolol Succinate ER 25 MG Oral Tablet 24 Hr, Take 1 tablet (25 mg total)

## 2018-06-21 NOTE — TELEPHONE ENCOUNTER
Re: ASA and Metoprolol  meets criteria. Refilled per protocol.   Ref Range & Units 6/30/17  4:11 AM    WBC 4.0 - 11.0 K/UL 9.7       - 400 K/UL 95           Hypertensive Medications  Protocol Criteria:  · Appointment scheduled with Cardiology in the

## 2018-12-20 DIAGNOSIS — I35.0 AORTIC VALVE STENOSIS, ETIOLOGY OF CARDIAC VALVE DISEASE UNSPECIFIED: Primary | ICD-10-CM

## 2018-12-20 NOTE — TELEPHONE ENCOUNTER
Aspirin  LOV reviewed. No change in this medication. No refill protocol for aspirin. Pended. Please advise.

## 2018-12-21 RX ORDER — METOPROLOL SUCCINATE 25 MG/1
TABLET, EXTENDED RELEASE ORAL
Qty: 60 TABLET | Refills: 0 | Status: SHIPPED | OUTPATIENT
Start: 2018-12-21 | End: 2019-01-21

## 2018-12-21 NOTE — TELEPHONE ENCOUNTER
Metoprolol LOV reviewed. No change in this medication. Labs > 3year old. [facility] 30 day supply sent to pharmacy per protocol. My Chart message sent to patient advising to have labs drawn.     Hypertensive Medications  Protocol Criteria

## 2018-12-26 RX ORDER — ASPIRIN 81 MG/1
TABLET, COATED ORAL
Qty: 90 TABLET | Refills: 1 | Status: SHIPPED | OUTPATIENT
Start: 2018-12-26 | End: 2019-06-26

## 2019-01-22 RX ORDER — METOPROLOL SUCCINATE 25 MG/1
TABLET, EXTENDED RELEASE ORAL
Qty: 60 TABLET | Refills: 0 | Status: SHIPPED | OUTPATIENT
Start: 2019-01-22 | End: 2019-02-21

## 2019-01-22 NOTE — TELEPHONE ENCOUNTER
Metoprolol LOV reviewed. No change in this medication. Labs outdated, ordered in December when 30 day supply was sent and message via [employer] to patient.   Called patient today, detailed message left to have labs completed per refill protocol prior to sen 06/30/2017

## 2019-01-23 ENCOUNTER — APPOINTMENT (OUTPATIENT)
Dept: LAB | Age: 64
End: 2019-01-23
Attending: NURSE PRACTITIONER
Payer: COMMERCIAL

## 2019-01-23 DIAGNOSIS — I35.0 AORTIC VALVE STENOSIS, ETIOLOGY OF CARDIAC VALVE DISEASE UNSPECIFIED: ICD-10-CM

## 2019-01-23 LAB
ANION GAP SERPL CALC-SCNC: 11 MMOL/L (ref 0–18)
BUN SERPL-MCNC: 12 MG/DL (ref 8–20)
BUN/CREAT SERPL: 17.9 (ref 10–20)
CALCIUM SERPL-MCNC: 9.6 MG/DL (ref 8.5–10.5)
CHLORIDE SERPL-SCNC: 101 MMOL/L (ref 95–110)
CO2 SERPL-SCNC: 27 MMOL/L (ref 22–32)
CREAT SERPL-MCNC: 0.67 MG/DL (ref 0.5–1.5)
GLUCOSE SERPL-MCNC: 98 MG/DL (ref 70–99)
OSMOLALITY UR CALC.SUM OF ELEC: 288 MOSM/KG (ref 275–295)
POTASSIUM SERPL-SCNC: 3.5 MMOL/L (ref 3.3–5.1)
SODIUM SERPL-SCNC: 139 MMOL/L (ref 136–144)

## 2019-01-23 PROCEDURE — 80048 BASIC METABOLIC PNL TOTAL CA: CPT

## 2019-01-23 PROCEDURE — 36415 COLL VENOUS BLD VENIPUNCTURE: CPT

## 2019-02-22 RX ORDER — METOPROLOL SUCCINATE 25 MG/1
TABLET, EXTENDED RELEASE ORAL
Qty: 180 TABLET | Refills: 1 | Status: SHIPPED | OUTPATIENT
Start: 2019-02-22 | End: 2019-08-23

## 2019-04-16 ENCOUNTER — OFFICE VISIT (OUTPATIENT)
Dept: CARDIOLOGY CLINIC | Facility: CLINIC | Age: 64
End: 2019-04-16
Payer: COMMERCIAL

## 2019-04-16 VITALS
BODY MASS INDEX: 19 KG/M2 | HEART RATE: 70 BPM | RESPIRATION RATE: 20 BRPM | DIASTOLIC BLOOD PRESSURE: 82 MMHG | WEIGHT: 119 LBS | SYSTOLIC BLOOD PRESSURE: 156 MMHG

## 2019-04-16 DIAGNOSIS — I35.0 AORTIC VALVE STENOSIS, ETIOLOGY OF CARDIAC VALVE DISEASE UNSPECIFIED: Primary | ICD-10-CM

## 2019-04-16 PROCEDURE — 99214 OFFICE O/P EST MOD 30 MIN: CPT | Performed by: INTERNAL MEDICINE

## 2019-04-16 PROCEDURE — 99212 OFFICE O/P EST SF 10 MIN: CPT | Performed by: INTERNAL MEDICINE

## 2019-04-16 NOTE — PROGRESS NOTES
[place] [facility]  PROGRESS NOTE    Loco Chadwick is a 58year old female. Patient presents with:   Follow - Up: aortic stenosis  Atrial Fibrillation  Hypertension    HPI:   This is a pleasant 58year old female with a history of tissue aortic v history.      REVIEW OF SYSTEMS:   GENERAL HEALTH: feels well otherwise  SKIN: denies any unusual skin lesions or rashes  RESPIRATORY: denies shortness of breath with exertion  CARDIOVASCULAR:See HPI  GI: denies abdominal pain and denies heartburn  NEURO: d

## 2019-06-22 DIAGNOSIS — I35.0 AORTIC STENOSIS, SEVERE: Primary | ICD-10-CM

## 2019-06-24 NOTE — TELEPHONE ENCOUNTER
ASA 81 mg daily LOV reviewed. No change in this medication. Labs > 1yr [facility] Detailed message left (HIPAA verified) for her to have lab work done prior to refilling script. Labs done meets refill. Refill sent.   Antiplatelet Medications

## 2019-06-25 ENCOUNTER — APPOINTMENT (OUTPATIENT)
Dept: LAB | Age: 64
End: 2019-06-25
Attending: NURSE PRACTITIONER
Payer: COMMERCIAL

## 2019-06-25 DIAGNOSIS — I35.0 AORTIC STENOSIS, SEVERE: ICD-10-CM

## 2019-06-25 PROCEDURE — 85027 COMPLETE CBC AUTOMATED: CPT

## 2019-06-25 PROCEDURE — 36415 COLL VENOUS BLD VENIPUNCTURE: CPT

## 2019-06-27 RX ORDER — ASPIRIN 81 MG/1
TABLET, COATED ORAL
Qty: 90 TABLET | Refills: 1 | Status: SHIPPED | OUTPATIENT
Start: 2019-06-27

## 2019-06-27 NOTE — TELEPHONE ENCOUNTER
Asa LOV reviewed. No change in this medication. Meets refill protocol criteria. Refill sent.   Antiplatelet Medications  Protocol Criteria:  · Appointment scheduled with Cardiology in the past 12 months or in the next 3 months  · CBC in past 12 months  ·

## 2019-07-01 RX ORDER — ASPIRIN 81 MG/1
TABLET, COATED ORAL
Qty: 90 TABLET | Refills: 1 | Status: SHIPPED | OUTPATIENT
Start: 2019-07-01

## 2019-08-26 RX ORDER — METOPROLOL SUCCINATE 25 MG/1
TABLET, EXTENDED RELEASE ORAL
Qty: 180 TABLET | Refills: 1 | Status: SHIPPED | OUTPATIENT
Start: 2019-08-26 | End: 2020-02-28

## 2020-02-28 RX ORDER — METOPROLOL SUCCINATE 25 MG/1
TABLET, EXTENDED RELEASE ORAL
Qty: 180 TABLET | Refills: 0 | Status: SHIPPED | OUTPATIENT
Start: 2020-02-28

## 2020-02-28 NOTE — TELEPHONE ENCOUNTER
Metoprolol Succinate ER 25 mg filled per protocol.   Hypertensive Medications  Protocol Criteria:  · Appointment scheduled with Cardiology in the past 12 months or in the next 3 months  · BMP or CMP in the past 12 months  · Potassium: 3.1 - 4.9 mmol/L  · Cr

## 2020-04-14 ENCOUNTER — TELEPHONE (OUTPATIENT)
Dept: CARDIOLOGY CLINIC | Facility: CLINIC | Age: 65
End: 2020-04-14

## 2020-04-14 NOTE — TELEPHONE ENCOUNTER
Annual follow up. LMTCB to reschedule in June if feeling well and otherwise if needing to speak with Dr. Roxy Means reschedule for telephone visit. Please attempt to put call thru to RN if needing appt sooner than June.

## 2021-02-13 DIAGNOSIS — Z23 NEED FOR VACCINATION: ICD-10-CM

## 2022-03-08 ENCOUNTER — LAB ENCOUNTER (OUTPATIENT)
Dept: LAB | Age: 67
End: 2022-03-08
Attending: INTERNAL MEDICINE
Payer: MEDICARE

## 2022-03-08 DIAGNOSIS — I35.9 AORTIC VALVE DISEASE: Primary | ICD-10-CM

## 2022-03-08 DIAGNOSIS — Z98.61 HISTORY OF PTCA: ICD-10-CM

## 2022-03-08 DIAGNOSIS — I25.10 CAD (CORONARY ARTERY DISEASE): ICD-10-CM

## 2022-03-08 LAB
ALBUMIN SERPL-MCNC: 4.2 G/DL (ref 3.4–5)
ALBUMIN/GLOB SERPL: 1.3 {RATIO} (ref 1–2)
ALP LIVER SERPL-CCNC: 61 U/L
ALT SERPL-CCNC: 24 U/L
ANION GAP SERPL CALC-SCNC: 5 MMOL/L (ref 0–18)
BASOPHILS # BLD AUTO: 0.05 X10(3) UL (ref 0–0.2)
BASOPHILS NFR BLD AUTO: 1 %
BILIRUB SERPL-MCNC: 0.6 MG/DL (ref 0.1–2)
BUN BLD-MCNC: 10 MG/DL (ref 7–18)
BUN/CREAT SERPL: 14.1 (ref 10–20)
CALCIUM BLD-MCNC: 9.5 MG/DL (ref 8.5–10.1)
CHLORIDE SERPL-SCNC: 105 MMOL/L (ref 98–112)
CHOLEST SERPL-MCNC: 233 MG/DL (ref ?–200)
CO2 SERPL-SCNC: 31 MMOL/L (ref 21–32)
CREAT BLD-MCNC: 0.71 MG/DL
DEPRECATED RDW RBC AUTO: 45.1 FL (ref 35.1–46.3)
EOSINOPHIL # BLD AUTO: 0.09 X10(3) UL (ref 0–0.7)
EOSINOPHIL NFR BLD AUTO: 1.8 %
ERYTHROCYTE [DISTWIDTH] IN BLOOD BY AUTOMATED COUNT: 13 % (ref 11–15)
FASTING PATIENT LIPID ANSWER: NO
FASTING STATUS PATIENT QL REPORTED: NO
GLOBULIN PLAS-MCNC: 3.3 G/DL (ref 2.8–4.4)
GLUCOSE BLD-MCNC: 109 MG/DL (ref 70–99)
HCT VFR BLD AUTO: 40.5 %
HGB BLD-MCNC: 13 G/DL
IMM GRANULOCYTES # BLD AUTO: 0.01 X10(3) UL (ref 0–1)
IMM GRANULOCYTES NFR BLD: 0.2 %
LDLC SERPL CALC-MCNC: 121 MG/DL (ref ?–100)
LYMPHOCYTES # BLD AUTO: 1.4 X10(3) UL (ref 1–4)
LYMPHOCYTES NFR BLD AUTO: 28.7 %
MCHC RBC AUTO-ENTMCNC: 32.1 G/DL (ref 31–37)
MCV RBC AUTO: 94.8 FL
MONOCYTES # BLD AUTO: 0.56 X10(3) UL (ref 0.1–1)
MONOCYTES NFR BLD AUTO: 11.5 %
NEUTROPHILS # BLD AUTO: 2.76 X10 (3) UL (ref 1.5–7.7)
NEUTROPHILS # BLD AUTO: 2.76 X10(3) UL (ref 1.5–7.7)
NEUTROPHILS NFR BLD AUTO: 56.8 %
NONHDLC SERPL-MCNC: 136 MG/DL (ref ?–130)
OSMOLALITY SERPL CALC.SUM OF ELEC: 292 MOSM/KG (ref 275–295)
PLATELET # BLD AUTO: 231 10(3)UL (ref 150–450)
POTASSIUM SERPL-SCNC: 4.3 MMOL/L (ref 3.5–5.1)
PROT SERPL-MCNC: 7.5 G/DL (ref 6.4–8.2)
SODIUM SERPL-SCNC: 141 MMOL/L (ref 136–145)
TRIGL SERPL-MCNC: 91 MG/DL (ref 30–149)
VLDLC SERPL CALC-MCNC: 16 MG/DL (ref 0–30)
WBC # BLD AUTO: 4.9 X10(3) UL (ref 4–11)

## 2022-03-08 PROCEDURE — 85025 COMPLETE CBC W/AUTO DIFF WBC: CPT

## 2022-03-08 PROCEDURE — 36415 COLL VENOUS BLD VENIPUNCTURE: CPT

## 2022-03-08 PROCEDURE — 80053 COMPREHEN METABOLIC PANEL: CPT

## 2022-03-08 PROCEDURE — 80061 LIPID PANEL: CPT

## 2022-03-15 ENCOUNTER — ORDER TRANSCRIPTION (OUTPATIENT)
Dept: ADMINISTRATIVE | Facility: HOSPITAL | Age: 67
End: 2022-03-15

## 2022-03-26 ENCOUNTER — HOSPITAL ENCOUNTER (OUTPATIENT)
Dept: CV DIAGNOSTICS | Facility: HOSPITAL | Age: 67
Discharge: HOME | End: 2022-03-26
Attending: INTERNAL MEDICINE
Payer: MEDICARE

## 2022-03-26 DIAGNOSIS — I25.10 CAD (CORONARY ARTERY DISEASE): ICD-10-CM

## 2022-03-26 PROCEDURE — 93306 TTE W/DOPPLER COMPLETE: CPT | Performed by: INTERNAL MEDICINE

## 2022-06-06 ENCOUNTER — HOSPITAL ENCOUNTER (OUTPATIENT)
Dept: CT IMAGING | Facility: HOSPITAL | Age: 67
End: 2022-06-06
Attending: INTERNAL MEDICINE

## 2022-06-06 ENCOUNTER — HOSPITAL ENCOUNTER (OUTPATIENT)
Dept: CT IMAGING | Facility: HOSPITAL | Age: 67
Discharge: HOME | End: 2022-06-06
Attending: FAMILY MEDICINE

## 2022-06-06 DIAGNOSIS — Z13.6 SCREENING FOR CARDIOVASCULAR CONDITION: ICD-10-CM

## 2023-12-09 NOTE — PROGRESS NOTES
[facility]  Cardiology Progress Note    Lacisotero Raojohann Patient Status:  Inpatient    1955 MRN [id number]   Location [facility] 3W/SW Attending Rico Ford, [address] [facility] Day # 4 PCP Clyde Hallman       Subjective: Feels better, await hours.    Invalid input(s): ALPHOS, TBIL, DBIL, TPROT    Recent Labs   Lab  05/25/17   0600   TROP  1.91*       Allergies:   No Known Allergies    Medications:    Current Facility-Administered Medications:  fentaNYL citrate (SUBLIMAZE) 0.05 MG/ML injection Name band;

## (undated) DIAGNOSIS — Z13.6 SCREENING FOR CARDIOVASCULAR CONDITION: Primary | ICD-10-CM

## (undated) DEVICE — Device: Brand: CDI™ SHUNT SENSOR

## (undated) DEVICE — SUTURE PDS II 2-0 CT-1

## (undated) DEVICE — BLOOD TRANSFUSION FILTER: Brand: HAEMONETICS

## (undated) DEVICE — 12 ML SYRINGE LUER-LOCK TIP: Brand: MONOJECT

## (undated) DEVICE — DRESSING FM 4.25X4.25IN PLMM

## (undated) DEVICE — CANNULA PRFSN 15IN 32/40FR .5

## (undated) DEVICE — SUTURE ETHIBOND 2-0 PXX50

## (undated) DEVICE — Device: Brand: DEFENDO AIR/WATER/SUCTION AND BIOPSY VALVE

## (undated) DEVICE — INTENT TO BE USED WITH SUTURE MATERIAL FOR TISSUE CLOSURE: Brand: RICHARD-ALLAN® NEEDLE 1/2 CIRCLE TAPER

## (undated) DEVICE — SOL  .9 1000ML BAG

## (undated) DEVICE — PLEDGETT SOFT 1/4 X 12

## (undated) DEVICE — SUTURE SURGICAL STEEL #7

## (undated) DEVICE — STERILE LATEX POWDER-FREE SURGICAL GLOVESWITH NITRILE COATING: Brand: PROTEXIS

## (undated) DEVICE — HEART DRAPE & SUPPLY PACK: Brand: MEDLINE INDUSTRIES, INC.

## (undated) DEVICE — RETROGRADE CARDIOPLEGIA CATHETER: Brand: EDWARDS LIFESCIENCES RETROGRADE CARDIOPLEGIA CATHETER

## (undated) DEVICE — ALARM PT PTCH LVL

## (undated) DEVICE — INSERT SUTURE OPEN HEART

## (undated) DEVICE — 3M™ TEGADERM™ TRANSPARENT FILM DRESSING, 1626W, 4 IN X 4-3/4 IN (10 CM X 12 CM), 50 EACH/CARTON, 4 CARTON/CASE: Brand: 3M™ TEGADERM™

## (undated) DEVICE — SUTURE PROLENE 4-0 SH

## (undated) DEVICE — SUTURE PROLENE 5-0 C-1

## (undated) DEVICE — SUTURE ETHIBOND 2-0 10X42

## (undated) DEVICE — SUTURE SILK 0 FSL

## (undated) DEVICE — PACK ASSRY CUSTOM TUBING

## (undated) DEVICE — SUTURE MONOCRYL 3-0 Y936H

## (undated) DEVICE — CS5/5+ FASTPACK, 225ML 150U RES: Brand: HAEMONETICS CELL SAVER 5/5+ SYSTEMS

## (undated) DEVICE — SUTURE SILK 1-0 SA87G

## (undated) DEVICE — FORESIGHT LARGE SENSOR: Brand: FORESIGHT

## (undated) DEVICE — SUTURE WIRE DOUBLE STERNOTOMY

## (undated) DEVICE — SUTURE PDS II 1 CTX

## (undated) DEVICE — TOWEL OR BLU 16X26 STRL

## (undated) DEVICE — SUTURE GUIDE

## (undated) DEVICE — BATTERY

## (undated) DEVICE — SUTURE PROLENE 4-0 BB

## (undated) DEVICE — NEEDLE CONTRAST INTERJECT 25G

## (undated) DEVICE — GUIDEWIRE MINI STICK 7CM 5F 21

## (undated) DEVICE — STERILE (10.2 X 147CM) TELESCOPICALLY-FOLDED COVER: Brand: CIV-FLEX™ TRANSDUCER COVER

## (undated) DEVICE — REM POLYHESIVE ADULT PATIENT RETURN ELECTRODE: Brand: VALLEYLAB

## (undated) DEVICE — ADAPTER CRDPLG ANTGRD RTRGD 3W

## (undated) DEVICE — CANNULA PRFSN 6IN 12FR COR

## (undated) DEVICE — NEEDLE HPO 18GA 1.5IN ECLPS

## (undated) DEVICE — SUTURE ETHIBOND 0 CT-1

## (undated) DEVICE — PACK CUSTOM TUBING

## (undated) DEVICE — DEVICE BLWR/MSTR ACCUMIST ATCH

## (undated) DEVICE — CONNECTOR PRFSN QCK PRM .25IN

## (undated) DEVICE — ENDOSCOPY PACK - LOWER: Brand: MEDLINE INDUSTRIES, INC.

## (undated) DEVICE — SUTURE PROLENE 3-0 SH

## (undated) DEVICE — SUTURE VICRYL 1-0 J977H

## (undated) DEVICE — DRAPE SLUSH/WARMER W/DISC

## (undated) DEVICE — HEMOCONCENTRATOR NO RINSE 0.3M

## (undated) DEVICE — LEAD BIPOLAR TEMP 6495XF53

## (undated) DEVICE — CANNULA AORTIC 21 FR SOFT FLOW

## (undated) DEVICE — 12 FOOT DISPOSABLE EXTENSION CABLE WITH SAFE CONNECT / SCREW-DOWN

## (undated) DEVICE — Device: Brand: LEVEL 1

## (undated) DEVICE — SOL  .9 1000ML BTL

## (undated) DEVICE — THORACIC CATHETER, STRAIGHT, SILICONE, WITH CLOTSTOP®: Brand: AXIOM® ATRAUM™ WITH CLOTSTOP®

## (undated) DEVICE — PACK CUSTOM VENT

## (undated) DEVICE — FLOSEAL SEALENT STERILE 10ML

## (undated) DEVICE — ABSORBABLE HEMOSTAT (OXIDIZED REGENERATED CELLULOSE, U.S.P.): Brand: SURGICEL

## (undated) DEVICE — ENDOSCOPY PACK UPPER: Brand: MEDLINE INDUSTRIES, INC.

## (undated) DEVICE — HEART A: Brand: MEDLINE INDUSTRIES, INC.

## (undated) DEVICE — FIAPC® PROBE W/ FILTER 2200 A OD 2.3MM/6.9FR; L 2.2M/7.2FT: Brand: ERBE

## (undated) DEVICE — CATH PRFSN 16FR 16IN LH

## (undated) DEVICE — SUCTION CANISTER, 3000CC,SAFELINER: Brand: DEROYAL

## (undated) DEVICE — CARTRIDGE HC HMS+ CRTDG SYR

## (undated) DEVICE — NDLCTR: FOAM/ADHESIVE 10CT 96/CS: Brand: MEDICAL ACTION INDUSTRIES

## (undated) DEVICE — PACK CSTM SCKR SRG PRC

## (undated) DEVICE — GOWN SURG AERO BLUE PERF LG

## (undated) DEVICE — FORCEP RADIAL JAW 4

## (undated) DEVICE — MEDI-VAC NON-CONDUCTIVE SUCTION TUBING: Brand: CARDINAL HEALTH

## (undated) NOTE — LETTER
[address]  INFORMED CONSENT FOR TRANSFUSION OF BLOOD OR BLOOD PRODUCTS   My physician has informed me of the nature, purpose, benefits and risks of transfusion for blood and blood components that he/she may deem nece (Signature of Patient)                                       (Responsible party in case of Minor,                                                                            Incompetent, or unconscious Patient)  __________________________________    _______

## (undated) NOTE — MR AVS SNAPSHOT
[facility] SPECIALTY [facility] [place] [address] [address] [id number] [phone]               Thank you for choosing us for your health care visit with Isauro Vivas MD.  We are glad to serve you and happy to provide you with this summary o Take 1 tablet (2.5 mg total) by mouth 2 (two) times daily.    Commonly known as:  VASOTEC           Pantoprazole Sodium 40 MG Tbec   Take 1 tablet (40 mg total) by mouth every morning before breakfast.   Commonly known as:  PROTONIX                Where to walking, light jogging, cycling, swimming, etc.) for a goal of at least 150 minutes per week. Moderation of alcohol consumption Men: limit to <= 2 drinks* per day. Women and lighter weight persons: limit to <= 1 drink* per day.                       Heal

## (undated) NOTE — LETTER
[facility]  Authorization for Invasive Procedures  1.  I hereby authorize Dr. Nory Wade , my physician and whomever may be designated as the doctor's assistant, to perform the following operation and/or procedure:  Cardiac the event that I wish to have autologous transfusions of my own blood, or a directed donor transfusion, I will discuss this with my physician.      5. I consent to the photographing of the operations or procedures to be performed for the purposes of advanci Responsible person in case of minor or unconscious: _____________________________Relationship: ____________     Witness Signature: ____________________________________________ Date: __________ Time: ___________    Statement of Physician  My signature below

## (undated) NOTE — IP AVS SNAPSHOT
[address]  [address] [facility] [phone]                Discharge Summary   5/23/2017    Gretel White           Admission Information        Provider Department    5/23/2017 Keri Yap DO [facility] 3w/Sw Where to Get Your Medications      Please  your prescriptions at the location directed by your doctor or nurse     Bring a paper prescription for each of these medications    - Cefpodoxime Proxetil 200 MG Tabs  - Enalapril Maleate 2.5 M - If you do not hear from your doctor's office within two weeks of your biopsy, please call them for your results. 1. Follow up with Dr Kain Segal in one week   2. Follow up with Cardiology in 3 weeks   3.  Follow up with Dr Car Christian ( surgery ) in 1-2 weeks Basophil Absolu    (05/28/17)  68 (05/28/17)  18 (05/28/17)  7 (05/28/17)  5 (05/28/17)  1 -- (05/28/17)  7.4 (05/28/17)  2.0 (05/28/17)  0.8 (05/28/17)  0.5 (05/28/17)  0.1    (05/27/17)  74 (05/27/17)  12 (05/27/17)  11 (05/27/17)  2 (05/27/17)  1  (05/2 can help with your Affordable Care Act coverage, as well as all types of Medicaid plans. To get signed up and covered, please call [phone] and ask to get set up for an insurance coverage that is in-network with Endy Schroeder temperature, rash, itching, shortness of breath, chills, nausea, and diarrhea           Blood Pressure and Cardiac Medications     Enalapril Maleate 2.5 MG Oral Tab         Use: Treat abnormal blood pressure (high or low), cardiac conditions; and/or abnorm

## (undated) NOTE — LETTER
[address]  INFORMED CONSENT FOR TRANSFUSION OF BLOOD OR BLOOD PRODUCTS   My physician has informed me of the nature, purpose, benefits and risks of transfusion for blood and blood components that he/she may deem nece (Signature of Patient)                                       (Responsible party in case of Minor,                                                                            Incompetent, or unconscious Patient)  __________________________________    _______

## (undated) NOTE — MR AVS SNAPSHOT
[place] [address] [place] [address] [phone]  [phone]               Thank you for choosing us for your health care visit with Mukesh Guevara NP.   We are glad to serve you and happy to provide yo with questions or there are any differences    · Heart healthy, decreased refined sugars, and  2000 mg sodium restricted diet and maintain fluids at 48 to 64 ounces per day.  Common high sodium foods include frozen dinners, soups (not homemade), some cereal Creatinine 0.75 0.50-1.50 mg/dL    Calcium, Total 9.1 8.5-10.5 mg/dL    BUN/CREA Ratio 24.0 10.0-20.0    Anion Gap 12 0-18 mmol/L    Calculated Osmolality 289 275-295 mOsm/kg    GFR, Non- >60 >=60    GFR, -American >60 >=60      Chr

## (undated) NOTE — LETTER
1/22/2019              Orpha Lev Maria        [address] [facility] [address]        [place] [address]         Dear Edil Jin,    [address] records indicate that the lab test, BMP, ordered for you by Vidhya Ponce MD  have not been done.   If you have,

## (undated) NOTE — LETTER
[facility]  Administration of Anesthesia  1.    Kulwinder Burk, or _________________________________ acting on his/her behalf, (Patient) (Dependent/Representative) request to receive anesthesia for my pending procedure/opera pressure, difficulty urinating, slowing of the baby's heart rate and headache. Rare risks include infections, high spinal         block, spinal bleeding, seizure, cardiac arrest and death.   7.   AWARENESS: I understand that it is possible (but unlike ________________________________________________  (Date) (Time)                                                                                                  (Responsible person in case of minor/ unconscious pt) /Relationship    My signature below affir

## (undated) NOTE — Clinical Note
06/02/2017    Dr. Jonathan Eldridge  [address] [place] [address] [facility] Dr. Bharath Singer ,     I saw Joe Sage in the heart failure clinic today.  She is a recent NSTEMI and acute HF secondary to severe AS and GIB with cecal AMV s/p cau

## (undated) NOTE — LETTER
[facility]  Administration of Anesthesia  1. Isamar Lisa, or _________________________________ acting on his/her behalf, (Patient) (Dependent/Representative) request to receive anesthesia for my pending procedure/operation/treatment. pressure, difficulty urinating, slowing of the baby's heart rate and headache. Rare risks include infections, high spinal         block, spinal bleeding, seizure, cardiac arrest and death.   7.   AWARENESS: I understand that it is possible (but unlike ________________________________________________  (Date) (Time)                                                                                                  (Responsible person in case of minor/ unconscious pt) /Relationship    My signature below affir

## (undated) NOTE — LETTER
[address] [place] [address] Rd, [place], [place]     AUTHORIZATION FOR SURGICAL OPERATION OR PROCEDURE    1.    I hereby authorize Dr. Rosalie Boo MD, my Physician(s) and whomever may be designated as the doctor's Assistant, to perform to have an autologous transfusion of my own blood, or a directed donor transfusion, I will discuss this with my         Physician.   5.   I consent to the photographing of procedure(s) to be performed for the purposes of advancing medicine, science (Responsible person in case of minor or unconscious patient)   (Relationship to Patient)      _______________________________________________________________ ____________________________  (Witness signature)

## (undated) NOTE — Clinical Note
06/02/2017    Dr. Patel Chase   [address] [place] [address] [facility] Dr. Barbie Lujan ,     I saw Suzi Shaikh in the heart failure clinic today.  She is a recent NSTEMI and acute HF secondary to severe AS and GIB with cecal AMV s/p cauter